# Patient Record
Sex: FEMALE | Race: WHITE | NOT HISPANIC OR LATINO | Employment: FULL TIME | URBAN - METROPOLITAN AREA
[De-identification: names, ages, dates, MRNs, and addresses within clinical notes are randomized per-mention and may not be internally consistent; named-entity substitution may affect disease eponyms.]

---

## 2021-10-12 ENCOUNTER — HOSPITAL ENCOUNTER (EMERGENCY)
Facility: HOSPITAL | Age: 31
Discharge: HOME | End: 2021-10-12
Attending: EMERGENCY MEDICINE | Admitting: EMERGENCY MEDICINE

## 2021-10-12 ENCOUNTER — APPOINTMENT (OUTPATIENT)
Dept: GENERAL RADIOLOGY | Facility: HOSPITAL | Age: 31
End: 2021-10-12

## 2021-10-12 VITALS
HEART RATE: 103 BPM | HEIGHT: 62 IN | RESPIRATION RATE: 16 BRPM | BODY MASS INDEX: 31.24 KG/M2 | WEIGHT: 169.75 LBS | TEMPERATURE: 97.8 F | SYSTOLIC BLOOD PRESSURE: 118 MMHG | DIASTOLIC BLOOD PRESSURE: 83 MMHG | OXYGEN SATURATION: 97 %

## 2021-10-12 DIAGNOSIS — S63.501A RIGHT WRIST SPRAIN, INITIAL ENCOUNTER: Primary | ICD-10-CM

## 2021-10-12 PROCEDURE — 73110 X-RAY EXAM OF WRIST: CPT

## 2021-10-12 PROCEDURE — 99282 EMERGENCY DEPT VISIT SF MDM: CPT

## 2021-10-12 NOTE — ED PROVIDER NOTES
Subjective   31-year-old female presents to the emergency department with complaints of right dorsal wrist swelling and tenderness after smashing it between 2 carts at work last night. Patient noted that swelling continued to increase, tenderness is gotten worse. She denies any significant pain, not requesting anything for pain relief at this time. Pain is nonradiating, 3/10, worse with extension of the wrist or palpation. Neurovascularly intact. Sensation intact. No other injuries to report at this time.      History provided by:  Patient   used: No        Review of Systems   Constitutional: Negative for chills and fever.   HENT: Negative for congestion, ear pain and sore throat.    Eyes: Negative for pain.   Respiratory: Negative for cough, chest tightness and shortness of breath.    Cardiovascular: Negative for chest pain.   Gastrointestinal: Negative for abdominal pain, diarrhea, nausea and vomiting.   Genitourinary: Negative for flank pain and hematuria.   Musculoskeletal: Negative for joint swelling.        Right wrist pain and swelling   Skin: Negative for pallor.   Neurological: Negative for seizures and headaches.   All other systems reviewed and are negative.      History reviewed. No pertinent past medical history.    No Known Allergies    History reviewed. No pertinent surgical history.    History reviewed. No pertinent family history.    Social History     Socioeconomic History   • Marital status:    Tobacco Use   • Smoking status: Never Smoker   Substance and Sexual Activity   • Alcohol use: Never   • Drug use: Never           Objective   Physical Exam  Vitals and nursing note reviewed.   Constitutional:       General: She is not in acute distress.     Appearance: Normal appearance. She is not toxic-appearing.   HENT:      Head: Normocephalic and atraumatic.      Mouth/Throat:      Mouth: Mucous membranes are moist.   Eyes:      Extraocular Movements: Extraocular movements  intact.      Pupils: Pupils are equal, round, and reactive to light.   Cardiovascular:      Rate and Rhythm: Normal rate and regular rhythm.      Pulses: Normal pulses.      Heart sounds: Normal heart sounds.   Pulmonary:      Effort: Pulmonary effort is normal. No respiratory distress.      Breath sounds: Normal breath sounds.   Abdominal:      General: Abdomen is flat.      Palpations: Abdomen is soft.      Tenderness: There is no abdominal tenderness.   Musculoskeletal:         General: Swelling, tenderness and signs of injury present. No deformity. Normal range of motion.        Arms:       Cervical back: Normal range of motion and neck supple.      Right lower leg: No edema.      Left lower leg: No edema.   Skin:     General: Skin is warm and dry.   Neurological:      Mental Status: She is alert and oriented to person, place, and time. Mental status is at baseline.         Procedures           ED Course                                           MDM  Number of Diagnoses or Management Options  Diagnosis management comments: The patient´s symptoms are consistent with right wrist sprain. The patient is now resting comfortably, feels better, is alert, talkative, interactive and in no distress. The examination is unremarkable and benign, with the exception of some mild swelling. The patient is neurologically intact and is ambulatory in the ED. The history, physical exam, and x-ray do not suggest acute fracture at this time or require other process requiring further testing, treatment or consultation in the emergency department. The vital signs have been stable. The patient's condition is stable and appropriate for discharge. The patient will pursue further outpatient evaluation with the primary care physician or other designated for consulting position as indicated in the discharge instructions.       Amount and/or Complexity of Data Reviewed  Tests in the radiology section of CPT®: reviewed and ordered    Risk of  Complications, Morbidity, and/or Mortality  Presenting problems: low  Diagnostic procedures: low  Management options: low    Patient Progress  Patient progress: stable      Final diagnoses:   Right wrist sprain, initial encounter       ED Disposition  ED Disposition     ED Disposition Condition Comment    Discharge Stable           No follow-up provider specified.       Medication List      No changes were made to your prescriptions during this visit.          Lorena Kraus PA-C  10/12/21 1022

## 2022-05-05 ENCOUNTER — LAB (OUTPATIENT)
Dept: LAB | Facility: HOSPITAL | Age: 32
End: 2022-05-05

## 2022-05-05 ENCOUNTER — OFFICE VISIT (OUTPATIENT)
Dept: FAMILY MEDICINE CLINIC | Facility: CLINIC | Age: 32
End: 2022-05-05

## 2022-05-05 ENCOUNTER — HOSPITAL ENCOUNTER (OUTPATIENT)
Dept: GENERAL RADIOLOGY | Facility: HOSPITAL | Age: 32
Discharge: HOME | End: 2022-05-05

## 2022-05-05 VITALS
HEIGHT: 62 IN | WEIGHT: 175 LBS | SYSTOLIC BLOOD PRESSURE: 121 MMHG | OXYGEN SATURATION: 98 % | BODY MASS INDEX: 32.2 KG/M2 | DIASTOLIC BLOOD PRESSURE: 69 MMHG | HEART RATE: 96 BPM

## 2022-05-05 DIAGNOSIS — R20.2 PARESTHESIA OF BOTH HANDS: ICD-10-CM

## 2022-05-05 DIAGNOSIS — Z76.89 ENCOUNTER TO ESTABLISH CARE: Primary | ICD-10-CM

## 2022-05-05 DIAGNOSIS — M79.641 PAIN IN BOTH HANDS: ICD-10-CM

## 2022-05-05 DIAGNOSIS — M79.642 PAIN IN BOTH HANDS: ICD-10-CM

## 2022-05-05 DIAGNOSIS — M25.40 JOINT SWELLING: ICD-10-CM

## 2022-05-05 DIAGNOSIS — J30.9 ALLERGIC RHINITIS, UNSPECIFIED SEASONALITY, UNSPECIFIED TRIGGER: ICD-10-CM

## 2022-05-05 LAB
CHROMATIN AB SERPL-ACNC: <10 IU/ML (ref 0–14)
CRP SERPL-MCNC: <0.3 MG/DL (ref 0–0.5)
URATE SERPL-MCNC: 3.6 MG/DL (ref 2.4–5.7)

## 2022-05-05 PROCEDURE — 36415 COLL VENOUS BLD VENIPUNCTURE: CPT

## 2022-05-05 PROCEDURE — 84550 ASSAY OF BLOOD/URIC ACID: CPT

## 2022-05-05 PROCEDURE — 86063 ANTISTREPTOLYSIN O SCREEN: CPT

## 2022-05-05 PROCEDURE — 86431 RHEUMATOID FACTOR QUANT: CPT

## 2022-05-05 PROCEDURE — 86140 C-REACTIVE PROTEIN: CPT

## 2022-05-05 PROCEDURE — 99204 OFFICE O/P NEW MOD 45 MIN: CPT | Performed by: NURSE PRACTITIONER

## 2022-05-05 PROCEDURE — 86060 ANTISTREPTOLYSIN O TITER: CPT

## 2022-05-05 PROCEDURE — 86225 DNA ANTIBODY NATIVE: CPT

## 2022-05-05 PROCEDURE — 86038 ANTINUCLEAR ANTIBODIES: CPT

## 2022-05-05 PROCEDURE — 73130 X-RAY EXAM OF HAND: CPT

## 2022-05-05 NOTE — PROGRESS NOTES
"Chief Complaint  Establish care, allergies, hand pain.  Subjective          Angie Harp presents to [facility] for   History of Present Illness  Patient presents today to establish Care (Patient would like referral to get back on allergy shots. ) and Hand Pain (Pain in both hands and arms, no known injury. Patient says it has gotten worse overtime. 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  States that sometimes her joints look swollen as well.  Patient previously on allergy shots would like to resume those.    Medical History  Past Medical History:   Diagnosis Date   • Allergic    • Anxiety    • Arthritis    • Depression      Surgical History  Past Surgical History:   Procedure Laterality Date   •  SECTION     • WISDOM TOOTH EXTRACTION       Social History  Social History     Socioeconomic History   • Marital status:    Tobacco Use   • Smoking status: Former Smoker   • Smokeless tobacco: Never Used   Substance and Sexual Activity   • Alcohol use: Never   • Drug use: Never     No current outpatient medications on file.    Review of Systems     Objective     /69   Pulse 96   Ht 157.5 cm (62\")   Wt 79.4 kg (175 lb)   SpO2 98%   BMI 32.01 kg/m²     Body mass index is 32.01 kg/m².    Physical Exam  Vitals reviewed.   Constitutional:       Appearance: Normal appearance. She is well-developed.   HENT:      Head: Normocephalic and atraumatic.      Right Ear: External ear normal.      Left Ear: External ear normal.   Eyes:      Conjunctiva/sclera: Conjunctivae normal.      Pupils: Pupils are equal, round, and reactive to light.   Cardiovascular:      Rate and Rhythm: Normal rate and regular rhythm.      Heart sounds: No murmur heard.    No " friction rub. No gallop.   Pulmonary:      Effort: Pulmonary effort is normal.      Breath sounds: Normal breath sounds. No wheezing or rhonchi.   Musculoskeletal:      Right hand: Tenderness present.      Left hand: Tenderness present.      Comments: Pos phalen sign on right     ttp base of bilat thumbs    Skin:     General: Skin is warm and dry.   Neurological:      Mental Status: She is alert and oriented to person, place, and time.      Cranial Nerves: No cranial nerve deficit.   Psychiatric:         Mood and Affect: Mood and affect normal.         Behavior: Behavior normal.         Thought Content: Thought content normal.         Judgment: Judgment normal.         Result Review :     The following data was reviewed by: RADHIKA Whiting on 05/05/2022:                         Assessment:  Diagnoses and all orders for this visit:    1. Encounter to establish care (Primary)    2. Allergic rhinitis, unspecified seasonality, unspecified trigger  -     Ambulatory Referral to Allergy    3. Pain in both hands  -     EMG & Nerve Conduction Test; Future  -     XR Hand 3+ View Bilateral; Future    4. Joint swelling  -     EMG & Nerve Conduction Test; Future  -     XR Hand 3+ View Bilateral; Future    5. Paresthesia of both hands  -     EMG & Nerve Conduction Test; Future              Follow Up     Return if symptoms worsen or fail to improve.    Patient was given instructions and counseling regarding her condition or for health maintenance advice. Please see specific information pulled into the AVS if appropriate.     RADHIKA Whiting  05/05/2022

## 2022-05-06 LAB
ASO AB SERPL-ACNC: POSITIVE [IU]/ML
DSDNA IGG SERPL IA-ACNC: NEGATIVE [IU]/ML
NUCLEAR IGG SER IA-RTO: NEGATIVE

## 2022-05-07 LAB — ASO AB SERPL-ACNC: 483.8 IU/ML (ref 0–200)

## 2022-05-09 ENCOUNTER — TELEPHONE (OUTPATIENT)
Dept: FAMILY MEDICINE CLINIC | Facility: CLINIC | Age: 32
End: 2022-05-09

## 2022-05-09 NOTE — TELEPHONE ENCOUNTER
All other results negative other than the ASO.  This is a titer that shows probable recent infection with strep.  Has she had strep throat or any other strep infection in the last few months that she is aware of?

## 2022-05-09 NOTE — TELEPHONE ENCOUNTER
I saw patient rheumatoid factor was negative, were you able to review her other results?    Thanks

## 2022-05-09 NOTE — TELEPHONE ENCOUNTER
Caller: Angie Harp    Relationship: Self    Best call back number: [phone]    What is the best time to reach you: ANY     Who are you requesting to speak with CLINICAL     What was the call regarding: PATIENT WOULD LIKE A CALL BACK REGARDING LAB RESULTS     Do you require a callback: YES      Request for :  Cymbalta 60 mg capsule take one capsule daily     Patient now seeing Vanita Duffy. Last prescription issued by [facility] 03/17/17; #90, with 1 refill   Deny request for refill  Denied as a duplicate

## 2022-05-09 NOTE — TELEPHONE ENCOUNTER
Patient has not had infection that she knows of. She said she has a sore throat now but not before then. Patient says she is not on any medication for this.

## 2022-05-10 ENCOUNTER — LAB (OUTPATIENT)
Dept: FAMILY MEDICINE CLINIC | Facility: CLINIC | Age: 32
End: 2022-05-10

## 2022-05-10 DIAGNOSIS — J02.9 SORE THROAT: Primary | ICD-10-CM

## 2022-05-10 LAB
EXPIRATION DATE: NORMAL
INTERNAL CONTROL: NORMAL
Lab: NORMAL
S PYO AG THROAT QL: NEGATIVE

## 2022-05-10 PROCEDURE — 87880 STREP A ASSAY W/OPTIC: CPT | Performed by: NURSE PRACTITIONER

## 2022-05-10 PROCEDURE — 87081 CULTURE SCREEN ONLY: CPT | Performed by: NURSE PRACTITIONER

## 2022-05-10 PROCEDURE — 87147 CULTURE TYPE IMMUNOLOGIC: CPT | Performed by: NURSE PRACTITIONER

## 2022-05-12 ENCOUNTER — TELEPHONE (OUTPATIENT)
Dept: FAMILY MEDICINE CLINIC | Facility: CLINIC | Age: 32
End: 2022-05-12

## 2022-05-13 LAB — BACTERIA SPEC AEROBE CULT: ABNORMAL

## 2022-05-16 DIAGNOSIS — J02.0 STREP THROAT: Primary | ICD-10-CM

## 2022-05-16 RX ORDER — AMOXICILLIN 875 MG/1
875 TABLET, COATED ORAL 2 TIMES DAILY
Qty: 20 TABLET | Refills: 0 | Status: SHIPPED | OUTPATIENT
Start: 2022-05-16 | End: 2022-08-29

## 2022-05-16 NOTE — TELEPHONE ENCOUNTER
----- Message from RADHIKA Rosas sent at 5/16/2022  7:10 AM EDT -----  Positive for strep, please cue up amoxicillin 875 mg 1 tab p.o. twice daily for 10 days #20 with no refills.

## 2022-08-01 ENCOUNTER — PROCEDURE VISIT (OUTPATIENT)
Dept: NEUROLOGY | Facility: CLINIC | Age: 32
End: 2022-08-01

## 2022-08-01 VITALS
BODY MASS INDEX: 32.2 KG/M2 | HEART RATE: 90 BPM | DIASTOLIC BLOOD PRESSURE: 78 MMHG | HEIGHT: 62 IN | SYSTOLIC BLOOD PRESSURE: 109 MMHG | WEIGHT: 175 LBS

## 2022-08-01 DIAGNOSIS — R20.2 PARESTHESIA OF BOTH HANDS: ICD-10-CM

## 2022-08-01 DIAGNOSIS — G56.03 BILATERAL CARPAL TUNNEL SYNDROME: Primary | ICD-10-CM

## 2022-08-01 DIAGNOSIS — M25.40 JOINT SWELLING: ICD-10-CM

## 2022-08-01 DIAGNOSIS — M79.641 PAIN IN BOTH HANDS: ICD-10-CM

## 2022-08-01 DIAGNOSIS — M79.642 PAIN IN BOTH HANDS: ICD-10-CM

## 2022-08-01 PROCEDURE — 99202 OFFICE O/P NEW SF 15 MIN: CPT | Performed by: PSYCHIATRY & NEUROLOGY

## 2022-08-01 PROCEDURE — 95910 NRV CNDJ TEST 7-8 STUDIES: CPT | Performed by: PSYCHIATRY & NEUROLOGY

## 2022-08-01 NOTE — PROGRESS NOTES
"Chief Complaint  Numbness (BUE ), Pain (BUE ), and Extremity Weakness (BUE )    Subjective          Angie Harp is a 32 y.o. female who presents to [facility]  History of Present Illness  32-year-old woman evaluated for bilateral hand numbness and tingling and pain.  She states that this been going on for the last 3 to 4 years but has been worse in the last year.  She states that it happens several times a day when she is working.  It depends what she is doing.  In the morning it could get numb and tingly and painful goes up her arm and it can last for an hour.  States that she is losing .  She drops things.  Objective   Vital Signs:   /78   Pulse 90   Ht 157.5 cm (62\")   Wt 79.4 kg (175 lb)   BMI 32.01 kg/m²     Physical Exam   There is 4/5 strength bilateral abductor pollicis brevis muscles.  There is no weakness other muscles tested including the flexor muscles of the fingers.  Phalen sign is positive almost immediately.  Pressure the wrist produces pain in her fingers.  There is pain in her thumb joints to palpation.        Assessment and Plan  Diagnoses and all orders for this visit:    1. Bilateral carpal tunnel syndrome (Primary)  Assessment & Plan:  Nerve conduction study is abnormal and shows electrophysiologic evidence for severe bilateral carpal tunnel syndrome.  I will refer her to orthopedic surgery.  I discussed with her that she can use a specific form of wrist splint to [email or web address].    Orders:  -     Ambulatory Referral to Orthopedic Surgery    2. Pain in both hands  -     EMG & Nerve Conduction Test    3. Joint swelling  -     EMG & Nerve Conduction Test    4. Paresthesia of both hands  -     EMG & Nerve Conduction Test       Nerve Conduction Study:  6 nerves     EMG:  Not done    Total time spent with the patient and coordinating patient care was 15 minutes.    Follow Up  No follow-ups on file.  Patient was given instructions and " counseling regarding her condition or for health maintenance advice. Please see specific information pulled into the AVS if appropriate.

## 2022-08-01 NOTE — ASSESSMENT & PLAN NOTE
Nerve conduction study is abnormal and shows electrophysiologic evidence for severe bilateral carpal tunnel syndrome.  I will refer her to orthopedic surgery.  I discussed with her that she can use a specific form of wrist splint to [email or web address].

## 2022-08-12 ENCOUNTER — OFFICE VISIT (OUTPATIENT)
Dept: ORTHOPEDIC SURGERY | Facility: CLINIC | Age: 32
End: 2022-08-12

## 2022-08-12 ENCOUNTER — TELEPHONE (OUTPATIENT)
Dept: ORTHOPEDIC SURGERY | Facility: CLINIC | Age: 32
End: 2022-08-12

## 2022-08-12 VITALS — BODY MASS INDEX: 32.2 KG/M2 | WEIGHT: 175 LBS | HEIGHT: 62 IN

## 2022-08-12 DIAGNOSIS — G56.03 BILATERAL CARPAL TUNNEL SYNDROME: Primary | ICD-10-CM

## 2022-08-12 PROCEDURE — 99203 OFFICE O/P NEW LOW 30 MIN: CPT | Performed by: ORTHOPAEDIC SURGERY

## 2022-08-12 NOTE — PROGRESS NOTES
"Chief Complaint  Initial Evaluation of the Right Hand and Initial Evaluation of the Left Hand     Subjective      Angie Harp presents to [facility] for an evaluation of bilateral hands. Patient states she went from day shift to night shift, she noticed numbness and tingling in bilateral hands. She states numbness and tingling for about 3 years, the last 2-3 months the symptoms significantly worsened. She has tried bracing, this has become less effective over time. She noticed a weakened gripped and difficulty with opening things.     No Known Allergies     Social History     Socioeconomic History   • Marital status:    Tobacco Use   • Smoking status: Former Smoker   • Smokeless tobacco: Never Used   Vaping Use   • Vaping Use: Never used   Substance and Sexual Activity   • Alcohol use: Never   • Drug use: Never        Review of Systems     Objective   Vital Signs:   Ht 157.5 cm (62\")   Wt 79.4 kg (175 lb)   BMI 32.01 kg/m²       Physical Exam  Constitutional:       Appearance: Normal appearance. Patient is well-developed and normal weight.   HENT:      Head: Normocephalic.      Right Ear: Hearing and external ear normal.      Left Ear: Hearing and external ear normal.      Nose: Nose normal.   Eyes:      Conjunctiva/sclera: Conjunctivae normal.   Cardiovascular:      Rate and Rhythm: Normal rate.   Pulmonary:      Effort: Pulmonary effort is normal.      Breath sounds: No wheezing or rales.   Abdominal:      Palpations: Abdomen is soft.      Tenderness: There is no abdominal tenderness.   Musculoskeletal:      Cervical back: Normal range of motion.   Skin:     Findings: No rash.   Neurological:      Mental Status: Patient  is alert and oriented to person, place, and time.   Psychiatric:         Mood and Affect: Mood and affect normal.         Judgment: Judgment normal.       Ortho Exam      BILATERAL HANDS: Mild thenar atrophy. Decreased sensation. Positive tinel's. " Positive median nerve compression test. Radial pulse 2+, ulnar pulse 2+. No swelling. Intact finger and wrist range of motion.       Procedures        Imaging Results (Most Recent)     None           Result Review :     [facility]      8/1/22     EMG     IMPRESSION: Severe bilateral carpal tunnel syndrome       Assessment and Plan     Diagnoses and all orders for this visit:    1. Bilateral carpal tunnel syndrome (Primary)        Discussed carpal tunnel release vs injection. She will consider her surgical options. May call back to schedule if and when she is ready. She will considre injections.     Discussed surgery. and Call or return if worsening symptoms.    Follow Up     PRN.       Patient was given instructions and counseling regarding her condition or for health maintenance advice. Please see specific information pulled into the AVS if appropriate.     Scribed for Kemar Perez MD by Antonietta Prieto.  08/12/22   14:45 EDT    I have personally performed the services described in this document as scribed by the above individual and it is both accurate and complete. Kemar Perez MD 08/12/22
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
## 2022-09-08 ENCOUNTER — TELEPHONE (OUTPATIENT)
Dept: ORTHOPEDIC SURGERY | Facility: CLINIC | Age: 32
End: 2022-09-08

## 2022-09-19 ENCOUNTER — OFFICE VISIT (OUTPATIENT)
Dept: ORTHOPEDIC SURGERY | Facility: CLINIC | Age: 32
End: 2022-09-19

## 2022-09-19 VITALS — HEIGHT: 62 IN | BODY MASS INDEX: 31.28 KG/M2 | WEIGHT: 170 LBS

## 2022-09-19 DIAGNOSIS — G56.03 BILATERAL CARPAL TUNNEL SYNDROME: Primary | ICD-10-CM

## 2022-09-19 PROCEDURE — 20526 THER INJECTION CARP TUNNEL: CPT | Performed by: ORTHOPAEDIC SURGERY

## 2022-09-19 RX ORDER — LIDOCAINE HYDROCHLORIDE 10 MG/ML
1 INJECTION, SOLUTION INFILTRATION; PERINEURAL
Status: COMPLETED | OUTPATIENT
Start: 2022-09-19 | End: 2022-09-19

## 2022-09-19 RX ORDER — TRIAMCINOLONE ACETONIDE 40 MG/ML
40 INJECTION, SUSPENSION INTRA-ARTICULAR; INTRAMUSCULAR
Status: COMPLETED | OUTPATIENT
Start: 2022-09-19 | End: 2022-09-19

## 2022-09-19 RX ADMIN — LIDOCAINE HYDROCHLORIDE 1 ML: 10 INJECTION, SOLUTION INFILTRATION; PERINEURAL at 09:43

## 2022-09-19 RX ADMIN — TRIAMCINOLONE ACETONIDE 40 MG: 40 INJECTION, SUSPENSION INTRA-ARTICULAR; INTRAMUSCULAR at 09:43

## 2022-09-19 NOTE — PROGRESS NOTES
"Chief Complaint  Follow-up of the Right Wrist and Follow-up of the Left Wrist     Subjective      Angie Harp presents to [facility] for a follow-up of bilateral wrists. Patient transitioned from night shift to day shift when she started noticing numbness and tingling in her hands. Symptoms have been ongoing for 3 years but the last few months it has worsened and become more persistent. She has  braced the hands but this method has become less effective with time. She has developed some weakness with her  strength. We had discussed carpal tunnel release and injections last visit. She is interested in trying injections today.      No Known Allergies     Social History     Socioeconomic History   • Marital status:    Tobacco Use   • Smoking status: Former Smoker   • Smokeless tobacco: Never Used   Vaping Use   • Vaping Use: Never used   Substance and Sexual Activity   • Alcohol use: Never   • Drug use: Never        Review of Systems     Objective   Vital Signs:   Ht 157.5 cm (62\")   Wt 77.1 kg (170 lb)   BMI 31.09 kg/m²       Physical Exam  Constitutional:       Appearance: Normal appearance. Patient is well-developed and normal weight.   HENT:      Head: Normocephalic.      Right Ear: Hearing and external ear normal.      Left Ear: Hearing and external ear normal.      Nose: Nose normal.   Eyes:      Conjunctiva/sclera: Conjunctivae normal.   Cardiovascular:      Rate and Rhythm: Normal rate.   Pulmonary:      Effort: Pulmonary effort is normal.      Breath sounds: No wheezing or rales.   Abdominal:      Palpations: Abdomen is soft.      Tenderness: There is no abdominal tenderness.   Musculoskeletal:      Cervical back: Normal range of motion.   Skin:     Findings: No rash.   Neurological:      Mental Status: Patient  is alert and oriented to person, place, and time.   Psychiatric:         Mood and Affect: Mood and affect normal.         Judgment: Judgment normal. "       Ortho Exam      BILATERAL WRISTS: Positive median nerve compression test. Full wrist extension, full wrist flexion, full  with some weakness, full thumb opposition. Full PIP flexors, full DIP flexors, full PIP extensors. Sensation grossly intact. Neurovascular intact.  Full abduction and adduction of the fingers. Skin intact. No swelling, skin discoloration or atrophy.       Medium Joint Arthrocentesis  Date/Time: 9/19/2022 9:43 AM  Consent given by: patient  Site marked: site marked  Timeout: Immediately prior to procedure a time out was called to verify the correct patient, procedure, equipment, support staff and site/side marked as required   Procedure Details  Location: wrist -   Preparation: Patient was prepped and draped in the usual sterile fashion  Needle size: 23 G  Medications administered: 1 mL lidocaine 1 %; 40 mg triamcinolone acetonide 40 MG/ML  Patient tolerance: patient tolerated the procedure well with no immediate complications    Medium Joint Arthrocentesis  Date/Time: 9/19/2022 9:43 AM  Consent given by: patient  Site marked: site marked  Timeout: Immediately prior to procedure a time out was called to verify the correct patient, procedure, equipment, support staff and site/side marked as required   Procedure Details  Location: wrist -   Preparation: Patient was prepped and draped in the usual sterile fashion  Needle size: 23 G  Medications administered: 1 mL lidocaine 1 %; 40 mg triamcinolone acetonide 40 MG/ML  Patient tolerance: patient tolerated the procedure well with no immediate complications              Imaging Results (Most Recent)     None           Result Review :         No results found.           Assessment and Plan     Diagnoses and all orders for this visit:    1. Bilateral carpal tunnel syndrome (Primary)        Plan for bilateral carpal tunnel injections.   Risks and benefits of carpal tunnel injections discussed, she understands and wishes to proceed. Patient  tolerated this well.     She is a candidate for a carpal tunnel release. If injections fail, she will consider proceeding with this.     Call or return if worsening symptoms.    Follow Up     Prn.       Patient was given instructions and counseling regarding her condition or for health maintenance advice. Please see specific information pulled into the AVS if appropriate.     Scribed for Kemar Perez MD by Antonietta Prieto.  09/19/22   09:38 EDT      I have personally performed the services described in this document as scribed by the above individual and it is both accurate and complete. eKmar Perez MD 09/19/22

## 2022-12-15 ENCOUNTER — HOSPITAL ENCOUNTER (EMERGENCY)
Facility: HOSPITAL | Age: 32
Discharge: LEFT BEFORE DISCHARGE | End: 2022-12-15
Admitting: EMERGENCY MEDICINE

## 2022-12-15 VITALS
RESPIRATION RATE: 18 BRPM | SYSTOLIC BLOOD PRESSURE: 125 MMHG | HEART RATE: 88 BPM | WEIGHT: 179.45 LBS | HEIGHT: 62 IN | DIASTOLIC BLOOD PRESSURE: 81 MMHG | OXYGEN SATURATION: 100 % | BODY MASS INDEX: 33.02 KG/M2 | TEMPERATURE: 98 F

## 2022-12-15 DIAGNOSIS — R10.84 GENERALIZED ABDOMINAL PAIN: Primary | ICD-10-CM

## 2022-12-15 LAB
ALBUMIN SERPL-MCNC: 4.3 G/DL (ref 3.5–5.2)
ALBUMIN/GLOB SERPL: 1.5 G/DL
ALP SERPL-CCNC: 48 U/L (ref 39–117)
ALT SERPL W P-5'-P-CCNC: 11 U/L (ref 1–33)
ANION GAP SERPL CALCULATED.3IONS-SCNC: 9.2 MMOL/L (ref 5–15)
AST SERPL-CCNC: 16 U/L (ref 1–32)
BACTERIA UR QL AUTO: ABNORMAL /HPF
BASOPHILS # BLD AUTO: 0.04 10*3/MM3 (ref 0–0.2)
BASOPHILS NFR BLD AUTO: 0.4 % (ref 0–1.5)
BILIRUB SERPL-MCNC: 0.4 MG/DL (ref 0–1.2)
BILIRUB UR QL STRIP: NEGATIVE
BUN SERPL-MCNC: 16 MG/DL (ref 6–20)
BUN/CREAT SERPL: 22.9 (ref 7–25)
CALCIUM SPEC-SCNC: 9.2 MG/DL (ref 8.6–10.5)
CHLORIDE SERPL-SCNC: 103 MMOL/L (ref 98–107)
CLARITY UR: CLEAR
CO2 SERPL-SCNC: 25.8 MMOL/L (ref 22–29)
COLOR UR: YELLOW
CREAT SERPL-MCNC: 0.7 MG/DL (ref 0.57–1)
DEPRECATED RDW RBC AUTO: 40.3 FL (ref 37–54)
EGFRCR SERPLBLD CKD-EPI 2021: 116.6 ML/MIN/1.73
EOSINOPHIL # BLD AUTO: 0.06 10*3/MM3 (ref 0–0.4)
EOSINOPHIL NFR BLD AUTO: 0.6 % (ref 0.3–6.2)
ERYTHROCYTE [DISTWIDTH] IN BLOOD BY AUTOMATED COUNT: 12.1 % (ref 12.3–15.4)
GLOBULIN UR ELPH-MCNC: 2.9 GM/DL
GLUCOSE SERPL-MCNC: 103 MG/DL (ref 65–99)
GLUCOSE UR STRIP-MCNC: NEGATIVE MG/DL
HCG INTACT+B SERPL-ACNC: <0.5 MIU/ML
HCT VFR BLD AUTO: 40.4 % (ref 34–46.6)
HGB BLD-MCNC: 13.9 G/DL (ref 12–15.9)
HGB UR QL STRIP.AUTO: NEGATIVE
HOLD SPECIMEN: NORMAL
HOLD SPECIMEN: NORMAL
HYALINE CASTS UR QL AUTO: ABNORMAL /LPF
IMM GRANULOCYTES # BLD AUTO: 0.03 10*3/MM3 (ref 0–0.05)
IMM GRANULOCYTES NFR BLD AUTO: 0.3 % (ref 0–0.5)
KETONES UR QL STRIP: NEGATIVE
LEUKOCYTE ESTERASE UR QL STRIP.AUTO: ABNORMAL
LIPASE SERPL-CCNC: 22 U/L (ref 13–60)
LYMPHOCYTES # BLD AUTO: 1.98 10*3/MM3 (ref 0.7–3.1)
LYMPHOCYTES NFR BLD AUTO: 20.9 % (ref 19.6–45.3)
MCH RBC QN AUTO: 31.2 PG (ref 26.6–33)
MCHC RBC AUTO-ENTMCNC: 34.4 G/DL (ref 31.5–35.7)
MCV RBC AUTO: 90.6 FL (ref 79–97)
MONOCYTES # BLD AUTO: 0.51 10*3/MM3 (ref 0.1–0.9)
MONOCYTES NFR BLD AUTO: 5.4 % (ref 5–12)
NEUTROPHILS NFR BLD AUTO: 6.84 10*3/MM3 (ref 1.7–7)
NEUTROPHILS NFR BLD AUTO: 72.4 % (ref 42.7–76)
NITRITE UR QL STRIP: NEGATIVE
NRBC BLD AUTO-RTO: 0 /100 WBC (ref 0–0.2)
PH UR STRIP.AUTO: 6.5 [PH] (ref 5–8)
PLATELET # BLD AUTO: 231 10*3/MM3 (ref 140–450)
PMV BLD AUTO: 10 FL (ref 6–12)
POTASSIUM SERPL-SCNC: 4.1 MMOL/L (ref 3.5–5.2)
PROT SERPL-MCNC: 7.2 G/DL (ref 6–8.5)
PROT UR QL STRIP: NEGATIVE
RBC # BLD AUTO: 4.46 10*6/MM3 (ref 3.77–5.28)
RBC # UR STRIP: ABNORMAL /HPF
REF LAB TEST METHOD: ABNORMAL
SODIUM SERPL-SCNC: 138 MMOL/L (ref 136–145)
SP GR UR STRIP: 1.01 (ref 1–1.03)
SQUAMOUS #/AREA URNS HPF: ABNORMAL /HPF
UROBILINOGEN UR QL STRIP: ABNORMAL
WBC # UR STRIP: ABNORMAL /HPF
WBC NRBC COR # BLD: 9.46 10*3/MM3 (ref 3.4–10.8)
WHOLE BLOOD HOLD COAG: NORMAL
WHOLE BLOOD HOLD SPECIMEN: NORMAL

## 2022-12-15 PROCEDURE — 83690 ASSAY OF LIPASE: CPT

## 2022-12-15 PROCEDURE — 81001 URINALYSIS AUTO W/SCOPE: CPT

## 2022-12-15 PROCEDURE — 85025 COMPLETE CBC W/AUTO DIFF WBC: CPT

## 2022-12-15 PROCEDURE — 84702 CHORIONIC GONADOTROPIN TEST: CPT

## 2022-12-15 PROCEDURE — 80053 COMPREHEN METABOLIC PANEL: CPT

## 2022-12-15 PROCEDURE — 36415 COLL VENOUS BLD VENIPUNCTURE: CPT

## 2022-12-15 PROCEDURE — 99283 EMERGENCY DEPT VISIT LOW MDM: CPT

## 2022-12-15 RX ORDER — SODIUM CHLORIDE 0.9 % (FLUSH) 0.9 %
10 SYRINGE (ML) INJECTION AS NEEDED
Status: DISCONTINUED | OUTPATIENT
Start: 2022-12-15 | End: 2022-12-15 | Stop reason: HOSPADM

## 2022-12-15 NOTE — ED PROVIDER NOTES
"Time: 2:42 PM EST  Chief Complaint:   Chief Complaint   Patient presents with   • Abdominal Pain           History of Present Illness:  Patient is a 32 y.o. year old female who presents to the emergency department with abdominal pain and nausea as well as being late on her menstrual period.  Patient reports that her menstrual cycles are very irregular.  She denies fever/chills, constipation or diarrhea.          HPI        Patient Care Team  Primary Care Provider: Angelica Lo APRN    Past Medical History:     No Known Allergies  Past Medical History:   Diagnosis Date   • Allergic    • Anxiety    • Arthritis    • Depression      Past Surgical History:   Procedure Laterality Date   •  SECTION     • WISDOM TOOTH EXTRACTION       Family History   Problem Relation Age of Onset   • Stroke Mother    • Heart disease Mother        Home Medications:  Prior to Admission medications    Medication Sig Start Date End Date Taking? Authorizing Provider   albuterol sulfate  (90 Base) MCG/ACT inhaler Inhale 2 puffs Every 4 (Four) Hours As Needed for Wheezing or Shortness of Air. 22   Katherin Lakhani APRN   methylPREDNISolone (MEDROL) 4 MG dose pack Take as directed on package instructions. 22   Katherin Lakhani APRN        Social History:   Social History     Tobacco Use   • Smoking status: Former   • Smokeless tobacco: Never   Vaping Use   • Vaping Use: Never used   Substance Use Topics   • Alcohol use: Never   • Drug use: Never         Review of Systems:  Review of Systems   Gastrointestinal: Positive for abdominal pain and nausea.   All other systems reviewed and are negative.       Physical Exam:  /81   Pulse 88   Temp 98 °F (36.7 °C)   Resp 18   Ht 157.5 cm (62\")   Wt 81.4 kg (179 lb 7.3 oz)   LMP 2022 (Approximate)   SpO2 100%   BMI 32.82 kg/m²     Physical Exam  HENT:      Head: Normocephalic.      Mouth/Throat:      Mouth: Mucous membranes are moist.   Eyes: "      Pupils: Pupils are equal, round, and reactive to light.   Pulmonary:      Effort: Pulmonary effort is normal.   Abdominal:      General: There is no distension.   Musculoskeletal:      Cervical back: Neck supple.   Skin:     General: Skin is warm and dry.   Neurological:      General: No focal deficit present.      Mental Status: She is alert and oriented to person, place, and time.   Psychiatric:         Mood and Affect: Mood normal.         Behavior: Behavior normal.                Medications in the Emergency Department:  Medications - No data to display     Labs  Lab Results (last 24 hours)     Procedure Component Value Units Date/Time    CBC & Differential [[id number]]  (Abnormal) Collected: 12/15/22 1305    Specimen: Blood Updated: 12/15/22 1320    Narrative:      The following orders were created for panel order CBC & Differential.  Procedure                               Abnormality         Status                     ---------                               -----------         ------                     CBC Auto Differential[[id number]]        Abnormal            Final result                 Please view results for these tests on the individual orders.    Comprehensive Metabolic Panel [[id number]]  (Abnormal) Collected: 12/15/22 1305    Specimen: Blood Updated: 12/15/22 1343     Glucose 103 mg/dL      BUN 16 mg/dL      Creatinine 0.70 mg/dL      Sodium 138 mmol/L      Potassium 4.1 mmol/L      Chloride 103 mmol/L      CO2 25.8 mmol/L      Calcium 9.2 mg/dL      Total Protein 7.2 g/dL      Albumin 4.30 g/dL      ALT (SGPT) 11 U/L      AST (SGOT) 16 U/L      Alkaline Phosphatase 48 U/L      Total Bilirubin 0.4 mg/dL      Globulin 2.9 gm/dL      A/G Ratio 1.5 g/dL      BUN/Creatinine Ratio 22.9     Anion Gap 9.2 mmol/L      eGFR 116.6 mL/min/1.73      Comment: National Kidney Foundation and American Society of Nephrology (ASN) Task Force recommended calculation based on the Chronic Kidney Disease Epidemiology  Collaboration (CKD-EPI) equation refit without adjustment for race.       Narrative:      GFR Normal >60  Chronic Kidney Disease <60  Kidney Failure <15      Lipase [[id number]]  (Normal) Collected: 12/15/22 1305    Specimen: Blood Updated: 12/15/22 1343     Lipase 22 U/L     hCG, Quantitative, Pregnancy [[id number]] Collected: 12/15/22 1305    Specimen: Blood Updated: 12/15/22 1343     HCG Quantitative <0.50 mIU/mL     Narrative:      HCG Ranges by Gestational Age    Females - non-pregnant premenopausal   </= 1mIU/mL HCG  Females - postmenopausal               </= 7mIU/mL HCG    3 Weeks       5.4   -      72 mIU/mL  4 Weeks      10.2   -     708 mIU/mL  5 Weeks       217   -   8,245 mIU/mL  6 Weeks       152   -  32,177 mIU/mL  7 Weeks     4,059   - 153,767 mIU/mL  8 Weeks    31,366   - 149,094 mIU/mL  9 Weeks    59,109   - 135,901 mIU/mL  10 Weeks   44,186   - 170,409 mIU/mL  12 Weeks   27,107   - 201,615 mIU/mL  14 Weeks   24,302   -  93,646 mIU/mL  15 Weeks   12,540   -  69,747 mIU/mL  16 Weeks    8,904   -  55,332 mIU/mL  17 Weeks    8,240   -  51,793 mIU/mL  18 Weeks    9,649   -  55,271 mIU/mL    Results may be falsely decreased if patient taking Biotin.      CBC Auto Differential [[id number]]  (Abnormal) Collected: 12/15/22 1305    Specimen: Blood Updated: 12/15/22 1320     WBC 9.46 10*3/mm3      RBC 4.46 10*6/mm3      Hemoglobin 13.9 g/dL      Hematocrit 40.4 %      MCV 90.6 fL      MCH 31.2 pg      MCHC 34.4 g/dL      RDW 12.1 %      RDW-SD 40.3 fl      MPV 10.0 fL      Platelets 231 10*3/mm3      Neutrophil % 72.4 %      Lymphocyte % 20.9 %      Monocyte % 5.4 %      Eosinophil % 0.6 %      Basophil % 0.4 %      Immature Grans % 0.3 %      Neutrophils, Absolute 6.84 10*3/mm3      Lymphocytes, Absolute 1.98 10*3/mm3      Monocytes, Absolute 0.51 10*3/mm3      Eosinophils, Absolute 0.06 10*3/mm3      Basophils, Absolute 0.04 10*3/mm3      Immature Grans, Absolute 0.03 10*3/mm3      nRBC 0.0 /100 WBC      Urinalysis With Microscopic If Indicated (No Culture) - Urine, Clean Catch [[id number]]  (Abnormal) Collected: 12/15/22 1313    Specimen: Urine, Clean Catch Updated: 12/15/22 1328     Color, UA Yellow     Appearance, UA Clear     pH, UA 6.5     Specific Gravity, UA 1.013     Glucose, UA Negative     Ketones, UA Negative     Bilirubin, UA Negative     Blood, UA Negative     Protein, UA Negative     Leuk Esterase, UA Trace     Nitrite, UA Negative     Urobilinogen, UA 0.2 E.U./dL    Urinalysis, Microscopic Only - Urine, Clean Catch [[id number]]  (Abnormal) Collected: 12/15/22 1313    Specimen: Urine, Clean Catch Updated: 12/15/22 1328     RBC, UA 0-2 /HPF      WBC, UA 0-2 /HPF      Bacteria, UA None Seen /HPF      Squamous Epithelial Cells, UA 0-2 /HPF      Hyaline Casts, UA 0-2 /LPF      Methodology Automated Microscopy           Imaging:  No Radiology Exams Resulted Within Past 24 Hours    Procedures:  Procedures    Progress                            The patient was initially evaluated in the triage area where orders were placed. The patient was later dispositioned by RADHIKA Kerr.      The patient was advised to stay for completion of workup which includes but is not limited to communication of labs and radiological results, reassessment and plan. The patient was advised that leaving prior to disposition by a provider could result in critical findings that are not communicated to the patient.     Medical Decision Making:  MDM  Number of Diagnoses or Management Options  Generalized abdominal pain  Diagnosis management comments: During Pit evaluation I discussed the patient's lab results thus far.  Patient wants to be discharged at this time. I advised patient that I would discharge her AGAINST MEDICAL ADVICE.    The patient has decision-making capacity. The patient understands the medical recommendations for further testing and evaluation.    The risks of leaving including death, permanent neurological  disability, cardiovascular collapse, shock, or worsening of their condition. The patient understands these risks and was able to verbalize them back to me.    Although I disagree with the patient's decision to leave, I do not feel that it is appropriate to hold the patient against their will.    The patient is still electing to leave.     The patient was advised and encouraged to return at any time to complete evaluation.  The patient will be discharged per patient request.  The patient is to return immediately for worsening of their condition or other concerns.  The patient was given discharge instructions.  Expeditious follow up was strongly encouraged.       Amount and/or Complexity of Data Reviewed  Clinical lab tests: reviewed and ordered    Risk of Complications, Morbidity, and/or Mortality  Presenting problems: low    Patient Progress  Patient progress: stable           The following orders were placed after triage and evaluation:  Orders Placed This Encounter   Procedures   • [place] Draw   • Comprehensive Metabolic Panel   • Lipase   • Urinalysis With Microscopic If Indicated (No Culture) - Urine, Clean Catch   • hCG, Quantitative, Pregnancy   • CBC Auto Differential   • Urinalysis, Microscopic Only - Urine, Clean Catch   • CBC & Differential   • Green Top (Gel)   • Lavender Top   • Gold Top - SST   • Light Blue Top       Final diagnoses:   Generalized abdominal pain          Disposition:  ED Disposition     ED Disposition   AMA    Condition   --    Comment   --             This medical record created using voice recognition software.           Freya Huynh, APRN  12/15/22 6302

## 2023-04-03 PROCEDURE — U0004 COV-19 TEST NON-CDC HGH THRU: HCPCS | Performed by: FAMILY MEDICINE

## 2023-04-04 ENCOUNTER — TELEPHONE (OUTPATIENT)
Dept: URGENT CARE | Facility: CLINIC | Age: 33
End: 2023-04-04
Payer: COMMERCIAL

## 2023-04-04 NOTE — TELEPHONE ENCOUNTER
----- Message from RADHIKA Lowery sent at 4/3/2023  8:25 PM EDT -----  Please notify patient of negative COVID-19 test result.

## 2023-07-17 PROBLEM — J45.909 ASTHMA: Status: ACTIVE | Noted: 2023-07-17

## 2023-07-17 PROBLEM — J30.9 ALLERGIC RHINITIS: Status: ACTIVE | Noted: 2023-07-17

## 2023-08-17 ENCOUNTER — OFFICE VISIT (OUTPATIENT)
Dept: FAMILY MEDICINE CLINIC | Facility: CLINIC | Age: 33
End: 2023-08-17
Payer: COMMERCIAL

## 2023-08-17 VITALS
WEIGHT: 178 LBS | HEIGHT: 62 IN | HEART RATE: 74 BPM | OXYGEN SATURATION: 97 % | BODY MASS INDEX: 32.76 KG/M2 | SYSTOLIC BLOOD PRESSURE: 112 MMHG | DIASTOLIC BLOOD PRESSURE: 59 MMHG

## 2023-08-17 DIAGNOSIS — H60.501 ACUTE OTITIS EXTERNA OF RIGHT EAR, UNSPECIFIED TYPE: Primary | ICD-10-CM

## 2023-08-17 PROCEDURE — 99213 OFFICE O/P EST LOW 20 MIN: CPT

## 2023-08-17 PROCEDURE — 1160F RVW MEDS BY RX/DR IN RCRD: CPT

## 2023-08-17 PROCEDURE — 1159F MED LIST DOCD IN RCRD: CPT

## 2023-08-17 RX ORDER — AMOXICILLIN AND CLAVULANATE POTASSIUM 875; 125 MG/1; MG/1
1 TABLET, FILM COATED ORAL 2 TIMES DAILY
Qty: 14 TABLET | Refills: 0 | Status: SHIPPED | OUTPATIENT
Start: 2023-08-17

## 2023-08-17 NOTE — PROGRESS NOTES
"Chief Complaint  Earache (/)    SUBJECTIVE  Angie Harp presents to [facility]    History of Present Illness  Patient is a 33-year-old female presents today for acute visit.  She is a patient of RADHIKA Portillo.  Patient reports complaints of right ear itching aching sore to the touch.  Patient reports that symptoms started last night.  Patient reports when she woke up this morning it was very sore.  Patient reports history of chronic ear infections.  Patient reports she has a hole in that tympanic membrane.  Patient does not want to use any type of eardrops.  No other concerns or questions today.        Past Medical History:   Diagnosis Date    Allergic     Anxiety     Arthritis     Depression       Family History   Problem Relation Age of Onset    Stroke Mother     Heart disease Mother       Past Surgical History:   Procedure Laterality Date     SECTION      WISDOM TOOTH EXTRACTION          Current Outpatient Medications:     albuterol sulfate  (90 Base) MCG/ACT inhaler, Inhale 2 puffs Every 4 (Four) Hours As Needed for Wheezing or Shortness of Air., Disp: 6.7 g, Rfl: 0    amoxicillin-clavulanate (AUGMENTIN) 875-125 MG per tablet, Take 1 tablet by mouth 2 (Two) Times a Day., Disp: 14 tablet, Rfl: 0    OBJECTIVE  Vital Signs:   /59   Pulse 74   Ht 157.5 cm (62\")   Wt 80.7 kg (178 lb)   LMP 2023   SpO2 97%   BMI 32.56 kg/mý    Estimated body mass index is 32.56 kg/mý as calculated from the following:    Height as of this encounter: 157.5 cm (62\").    Weight as of this encounter: 80.7 kg (178 lb).     Wt Readings from Last 3 Encounters:   23 80.7 kg (178 lb)   23 81.6 kg (180 lb)   23 80.3 kg (177 lb)     BP Readings from Last 3 Encounters:   23 112/59   23 110/71   23 108/65       Physical Exam  Vitals reviewed.   Constitutional:       Appearance: Normal appearance.   HENT:      Head: Normocephalic " and atraumatic.      Right Ear: Tympanic membrane normal. Swelling and tenderness present.      Ears:      Comments: Right ear canal erythematous and mild swelling, no drainage noted.     Nose: Nose normal.   Eyes:      Conjunctiva/sclera: Conjunctivae normal.   Cardiovascular:      Rate and Rhythm: Normal rate and regular rhythm.      Heart sounds: Normal heart sounds.   Pulmonary:      Effort: Pulmonary effort is normal.      Breath sounds: Normal breath sounds.   Skin:     General: Skin is warm and dry.   Neurological:      General: No focal deficit present.      Mental Status: She is alert and oriented to person, place, and time.   Psychiatric:         Mood and Affect: Mood normal.         Behavior: Behavior normal.         Thought Content: Thought content normal.         Judgment: Judgment normal.        Result Review    Common labs          12/15/2022    13:05 7/19/2023    12:25   Common Labs   Glucose 103  80    BUN 16  16    Creatinine 0.70  0.79    Sodium 138  138    Potassium 4.1  4.1    Chloride 103  101    Calcium 9.2  9.8    Albumin 4.30  4.4    Total Bilirubin 0.4  0.7    Alkaline Phosphatase 48  43    AST (SGOT) 16  22    ALT (SGPT) 11  17    WBC 9.46  5.78    Hemoglobin 13.9  13.8    Hematocrit 40.4  41.0    Platelets 231  212    Total Cholesterol  161    Triglycerides  51    HDL Cholesterol  60    LDL Cholesterol   90        No Images in the past 120 days found..      The above data has been reviewed by RADHIKA Benton 08/17/2023 11:12 EDT.          Patient Care Team:  Angelica Lo APRN as PCP - General (Nurse Practitioner)            ASSESSMENT & PLAN    Diagnoses and all orders for this visit:    1. Acute otitis externa of right ear, unspecified type (Primary)  Comments:  refusing ear drops, start augmentin  Orders:  -     amoxicillin-clavulanate (AUGMENTIN) 875-125 MG per tablet; Take 1 tablet by mouth 2 (Two) Times a Day.  Dispense: 14 tablet; Refill: 0         Tobacco Use: Medium  Risk    Smoking Tobacco Use: Former    Smokeless Tobacco Use: Never    Passive Exposure: Not on file       Follow Up     Return if symptoms worsen or fail to improve.      Patient was given instructions and counseling regarding her condition or for health maintenance advice. Please see specific information pulled into the AVS if appropriate.   I have reviewed information obtained and documented by others and I have confirmed the accuracy of this documented note.    RADHIKA Benton

## 2023-10-31 ENCOUNTER — HOSPITAL ENCOUNTER (EMERGENCY)
Facility: HOSPITAL | Age: 33
Discharge: HOME | End: 2023-10-31
Attending: EMERGENCY MEDICINE | Admitting: EMERGENCY MEDICINE
Payer: COMMERCIAL

## 2023-10-31 VITALS
RESPIRATION RATE: 18 BRPM | SYSTOLIC BLOOD PRESSURE: 104 MMHG | HEIGHT: 62 IN | BODY MASS INDEX: 32.33 KG/M2 | HEART RATE: 81 BPM | TEMPERATURE: 99.4 F | DIASTOLIC BLOOD PRESSURE: 69 MMHG | WEIGHT: 175.71 LBS | OXYGEN SATURATION: 95 %

## 2023-10-31 DIAGNOSIS — E86.0 DEHYDRATION: ICD-10-CM

## 2023-10-31 DIAGNOSIS — B34.9 VIRAL ILLNESS: Primary | ICD-10-CM

## 2023-10-31 LAB
ALBUMIN SERPL-MCNC: 4.5 G/DL (ref 3.5–5.2)
ALBUMIN/GLOB SERPL: 1.5 G/DL
ALP SERPL-CCNC: 55 U/L (ref 39–117)
ALT SERPL W P-5'-P-CCNC: 10 U/L (ref 1–33)
ANION GAP SERPL CALCULATED.3IONS-SCNC: 9.7 MMOL/L (ref 5–15)
AST SERPL-CCNC: 21 U/L (ref 1–32)
BASOPHILS # BLD AUTO: 0.04 10*3/MM3 (ref 0–0.2)
BASOPHILS NFR BLD AUTO: 0.3 % (ref 0–1.5)
BILIRUB SERPL-MCNC: 0.9 MG/DL (ref 0–1.2)
BUN SERPL-MCNC: 16 MG/DL (ref 6–20)
BUN/CREAT SERPL: 18.8 (ref 7–25)
CALCIUM SPEC-SCNC: 9.8 MG/DL (ref 8.6–10.5)
CHLORIDE SERPL-SCNC: 100 MMOL/L (ref 98–107)
CO2 SERPL-SCNC: 27.3 MMOL/L (ref 22–29)
CREAT SERPL-MCNC: 0.85 MG/DL (ref 0.57–1)
DEPRECATED RDW RBC AUTO: 40 FL (ref 37–54)
EGFRCR SERPLBLD CKD-EPI 2021: 91.8 ML/MIN/1.73
EOSINOPHIL # BLD AUTO: 0.02 10*3/MM3 (ref 0–0.4)
EOSINOPHIL NFR BLD AUTO: 0.1 % (ref 0.3–6.2)
ERYTHROCYTE [DISTWIDTH] IN BLOOD BY AUTOMATED COUNT: 12.2 % (ref 12.3–15.4)
FLUAV SUBTYP SPEC NAA+PROBE: NOT DETECTED
FLUBV RNA ISLT QL NAA+PROBE: NOT DETECTED
GLOBULIN UR ELPH-MCNC: 3.1 GM/DL
GLUCOSE SERPL-MCNC: 103 MG/DL (ref 65–99)
HCT VFR BLD AUTO: 40.3 % (ref 34–46.6)
HGB BLD-MCNC: 13.7 G/DL (ref 12–15.9)
HOLD SPECIMEN: NORMAL
HOLD SPECIMEN: NORMAL
IMM GRANULOCYTES # BLD AUTO: 0.04 10*3/MM3 (ref 0–0.05)
IMM GRANULOCYTES NFR BLD AUTO: 0.3 % (ref 0–0.5)
LYMPHOCYTES # BLD AUTO: 1.43 10*3/MM3 (ref 0.7–3.1)
LYMPHOCYTES NFR BLD AUTO: 10.5 % (ref 19.6–45.3)
MAGNESIUM SERPL-MCNC: 1.7 MG/DL (ref 1.6–2.6)
MCH RBC QN AUTO: 30.5 PG (ref 26.6–33)
MCHC RBC AUTO-ENTMCNC: 34 G/DL (ref 31.5–35.7)
MCV RBC AUTO: 89.8 FL (ref 79–97)
MONOCYTES # BLD AUTO: 0.7 10*3/MM3 (ref 0.1–0.9)
MONOCYTES NFR BLD AUTO: 5.1 % (ref 5–12)
NEUTROPHILS NFR BLD AUTO: 11.42 10*3/MM3 (ref 1.7–7)
NEUTROPHILS NFR BLD AUTO: 83.7 % (ref 42.7–76)
NRBC BLD AUTO-RTO: 0 /100 WBC (ref 0–0.2)
PLATELET # BLD AUTO: 258 10*3/MM3 (ref 140–450)
PMV BLD AUTO: 10.1 FL (ref 6–12)
POTASSIUM SERPL-SCNC: 3.8 MMOL/L (ref 3.5–5.2)
PROT SERPL-MCNC: 7.6 G/DL (ref 6–8.5)
QT INTERVAL: 389 MS
QTC INTERVAL: 466 MS
RBC # BLD AUTO: 4.49 10*6/MM3 (ref 3.77–5.28)
RSV RNA NPH QL NAA+NON-PROBE: NOT DETECTED
S PYO AG THROAT QL: NEGATIVE
SARS-COV-2 RNA RESP QL NAA+PROBE: NOT DETECTED
SODIUM SERPL-SCNC: 137 MMOL/L (ref 136–145)
TROPONIN T SERPL HS-MCNC: <6 NG/L
WBC NRBC COR # BLD: 13.65 10*3/MM3 (ref 3.4–10.8)
WHOLE BLOOD HOLD COAG: NORMAL
WHOLE BLOOD HOLD SPECIMEN: NORMAL

## 2023-10-31 PROCEDURE — 87637 SARSCOV2&INF A&B&RSV AMP PRB: CPT | Performed by: REGISTERED NURSE

## 2023-10-31 PROCEDURE — 99283 EMERGENCY DEPT VISIT LOW MDM: CPT

## 2023-10-31 PROCEDURE — 87880 STREP A ASSAY W/OPTIC: CPT | Performed by: REGISTERED NURSE

## 2023-10-31 PROCEDURE — 93005 ELECTROCARDIOGRAM TRACING: CPT | Performed by: EMERGENCY MEDICINE

## 2023-10-31 PROCEDURE — 25810000003 SODIUM CHLORIDE 0.9 % SOLUTION: Performed by: REGISTERED NURSE

## 2023-10-31 PROCEDURE — 84484 ASSAY OF TROPONIN QUANT: CPT | Performed by: EMERGENCY MEDICINE

## 2023-10-31 PROCEDURE — 87081 CULTURE SCREEN ONLY: CPT | Performed by: REGISTERED NURSE

## 2023-10-31 PROCEDURE — 83735 ASSAY OF MAGNESIUM: CPT | Performed by: EMERGENCY MEDICINE

## 2023-10-31 PROCEDURE — 85025 COMPLETE CBC W/AUTO DIFF WBC: CPT | Performed by: EMERGENCY MEDICINE

## 2023-10-31 PROCEDURE — 80053 COMPREHEN METABOLIC PANEL: CPT | Performed by: EMERGENCY MEDICINE

## 2023-10-31 PROCEDURE — 93005 ELECTROCARDIOGRAM TRACING: CPT

## 2023-10-31 RX ORDER — SODIUM CHLORIDE 0.9 % (FLUSH) 0.9 %
10 SYRINGE (ML) INJECTION AS NEEDED
Status: DISCONTINUED | OUTPATIENT
Start: 2023-10-31 | End: 2023-10-31 | Stop reason: HOSPADM

## 2023-10-31 RX ORDER — ONDANSETRON 4 MG/1
4 TABLET, ORALLY DISINTEGRATING ORAL EVERY 8 HOURS PRN
Qty: 30 TABLET | Refills: 0 | Status: SHIPPED | OUTPATIENT
Start: 2023-10-31 | End: 2023-11-10

## 2023-10-31 RX ADMIN — SODIUM CHLORIDE 1000 ML: 9 INJECTION, SOLUTION INTRAVENOUS at 08:22

## 2023-10-31 NOTE — ED PROVIDER NOTES
"Time: 7:50 AM EDT  Date of encounter:  10/31/2023  Independent Historian/Clinical History and Information was obtained by:   Patient    History is limited by: N/A    Chief Complaint: Syncope      History of Present Illness:  Patient is a 33 y.o. year old female who presents to the emergency department for evaluation after syncopal episode.  Patient states she has felt \"under the weather\" all night while at work and left work at [employer] early this morning around 4 AM.  Patient reports that she has had sore throat, body aches, headache since yesterday.  States she was standing at her kitchen sink when she was starting to feel nauseated and lightheaded and then woke up on the floor.  Denies fever/chills, abdominal pain, diarrhea or constipation.  Denies cough, chest pain, shortness of breath.    HPI    Patient Care Team  Primary Care Provider: Angelica Lo APRN    Past Medical History:     No Known Allergies  Past Medical History:   Diagnosis Date    Allergic     Anxiety     Arthritis     Depression      Past Surgical History:   Procedure Laterality Date     SECTION      WISDOM TOOTH EXTRACTION       Family History   Problem Relation Age of Onset    Stroke Mother     Heart disease Mother        Home Medications:  Prior to Admission medications    Medication Sig Start Date End Date Taking? Authorizing Provider   albuterol sulfate  (90 Base) MCG/ACT inhaler Inhale 2 puffs Every 4 (Four) Hours As Needed for Wheezing or Shortness of Air. 23   Angelica Lo APRN   amoxicillin-clavulanate (AUGMENTIN) 875-125 MG per tablet Take 1 tablet by mouth 2 (Two) Times a Day. 8/17/23 10/31/23  Dulce Quiroz APRN        Social History:   Social History     Tobacco Use    Smoking status: Former    Smokeless tobacco: Never   Vaping Use    Vaping Use: Some days    Substances: Nicotine    Devices: Disposable   Substance Use Topics    Alcohol use: Never    Drug use: Never         Review of " "Systems:  Review of Systems   I performed a 10 point review of systems which was all negative, except for the positives found in the HPI above.  Physical Exam:  /69   Pulse 81   Temp 99.4 °F (37.4 °C) (Oral)   Resp 18   Ht 157.5 cm (62\")   Wt 79.7 kg (175 lb 11.3 oz)   LMP 10/24/2023   SpO2 95%   BMI 32.14 kg/m²     Physical Exam     General: Awake alert and in no acute distress     HEENT: Head normocephalic atraumatic, eyes PERRLA EOMI, nose normal, pharyngeal erythema, +2 tonsils bilaterally.     Neck: Supple full range of motion, no meningismus, no lymphadenopathy     Heart: Regular rate and rhythm, no murmurs or rubs, 2+ radial pulses bilaterally     Lungs: Clear to auscultation bilaterally without wheezes or crackles, no respiratory distress     Abdomen: Soft, nontender, nondistended, no rebound or guarding     Back exam:  No L-spine tenderness.  No rash.     Skin: Warm, dry, no rash     Musculoskeletal: Normal range of motion, no lower extremity edema     Neurologic: Oriented x3, no motor deficits no sensory deficits     Psychiatric: Mood appears stable, no psychosis          Procedures:  Procedures      Medical Decision Making:      Comorbidities that affect care:    None    External Notes reviewed:          The following orders were placed and all results were independently analyzed by me:  Orders Placed This Encounter   Procedures    COVID-19, FLU A/B, RSV PCR - Swab, Nasopharynx    Rapid Strep A Screen - Swab, Throat    Beta Strep Culture, Throat - Swab, Throat    [place] Draw    Comprehensive Metabolic Panel    Magnesium    Single High Sensitivity Troponin T    CBC Auto Differential    Undress & Gown    Continuous Pulse Oximetry    Vital Signs    Orthostatic Blood Pressure    Orthostatic Vitals    ECG 12 Lead ED Triage Standing Order; Syncope    CBC & Differential    Green Top (Gel)    Lavender Top    Gold Top - SST    Light Blue Top       Medications Given in the Emergency " Department:  Medications   sodium chloride 0.9 % bolus 1,000 mL (0 mL Intravenous Stopped 10/31/23 1031)        ED Course:         Labs:    Lab Results (last 24 hours)       ** No results found for the last 24 hours. **             Imaging:    No Radiology Exams Resulted Within Past 24 Hours      Differential Diagnosis and Discussion:    Sore Throat: Differential diagnosis includes but is not limited to bacterial infection, viral infection, inhaled irritants, sinus drainage, thyroiditis, epiglottitis, and retropharyngeal abscess.  Syncope: Differential diagnosis includes but is not limited to TIA, hyperventilation, aortic stenosis, pulmonary emboli, myocardial disease, bradycardia arrhythmia, heart block, tachyarrhythmia, vasovagal, orthostatic hypotension, ruptured AAA, aortic dissection, subarachnoid hemorrhage, seizure, hypoglycemia.    All labs were reviewed and interpreted by me.  EKG was interpreted by me.    MDM  Number of Diagnoses or Management Options  Dehydration  Viral illness  Diagnosis management comments: The patient is resting comfortably and feels better after IV fluids, is alert, talkative and in no distress. The repeat examination is unremarkable and benign. The patient is neurologically intact, has a normal mental status and this ambulatory in the ED. The history, exam, diagnostic testing in the patient's current condition do not suggest meningitis, stroke, sepsis, subarachnoid hemorrhage, intracranial bleeding, encephalitis or other significant pathology that would warrant further testing, continued ED treatment, admission, neurological consultation, or other specialist evaluation at this point. The vital signs have been stable. The patient's condition is stable and appropriate for discharge. The patient will pursue further outpatient evaluation with the primary care physician or other designated or consulting position as indicated in the discharge instructions. Discussed return precautions,  discharge instructions and answered all their questions.        Amount and/or Complexity of Data Reviewed  Clinical lab tests: reviewed and ordered  Tests in the medicine section of CPT®: reviewed and ordered    Risk of Complications, Morbidity, and/or Mortality  Presenting problems: moderate  Diagnostic procedures: low  Management options: minimal    Patient Progress  Patient progress: improved             Patient Care Considerations:          Consultants/Shared Management Plan:        Social Determinants of Health:    Patient is independent, reliable, and has access to care.       Disposition and Care Coordination:            Final diagnoses:   Viral illness   Dehydration        ED Disposition       ED Disposition   Discharge    Condition   Stable    Comment   --               This medical record created using voice recognition software.             Freya Huynh, APRN  11/03/23 0148

## 2023-10-31 NOTE — DISCHARGE INSTRUCTIONS
Your flu, strep and COVID swab were negative.  You likely have some other viral illness.  Rest.  Drink plenty fluids.  Eat a healthful diet.    Take Zofran as needed for nausea.  Take Tylenol Motrin as needed for fever or body aches.    Follow-up with your PCP in 3 to 4 days if not better.    Return for any new concerns.

## 2023-11-02 LAB — BACTERIA SPEC AEROBE CULT: NORMAL

## 2023-11-14 ENCOUNTER — OFFICE VISIT (OUTPATIENT)
Dept: FAMILY MEDICINE CLINIC | Facility: CLINIC | Age: 33
End: 2023-11-14
Payer: COMMERCIAL

## 2023-11-14 VITALS
WEIGHT: 174.4 LBS | HEIGHT: 62 IN | HEART RATE: 85 BPM | OXYGEN SATURATION: 99 % | SYSTOLIC BLOOD PRESSURE: 137 MMHG | DIASTOLIC BLOOD PRESSURE: 75 MMHG | BODY MASS INDEX: 32.09 KG/M2

## 2023-11-14 DIAGNOSIS — R11.0 NAUSEA: ICD-10-CM

## 2023-11-14 DIAGNOSIS — R42 DIZZINESS: Primary | ICD-10-CM

## 2023-11-14 PROCEDURE — 99213 OFFICE O/P EST LOW 20 MIN: CPT

## 2023-11-14 PROCEDURE — 1160F RVW MEDS BY RX/DR IN RCRD: CPT

## 2023-11-14 PROCEDURE — 1159F MED LIST DOCD IN RCRD: CPT

## 2023-11-14 RX ORDER — MECLIZINE HYDROCHLORIDE 25 MG/1
25 TABLET ORAL 3 TIMES DAILY PRN
Qty: 30 TABLET | Refills: 0 | Status: SHIPPED | OUTPATIENT
Start: 2023-11-14

## 2023-11-14 NOTE — PROGRESS NOTES
"Chief Complaint  Dizziness, nausea    SUBJECTIVE  Angieparth Harp presents to [facility]    History of Present Illness  Patient is a 33-year-old female who presents today with complaints of dizziness and lightheadedness.  Patient was seen in the ER on  for fainting.  Patient has not fainted since then.  Patient reports when she moves positions or turns her head too fast she has dizziness.  Patient reports she also has accompanying nausea.  Patient reports she has Zofran at home from the ER.  Patient reports she has an appointment with ENT in 3 days.  Patient has a history of perforated tympanic membrane.    Past Medical History:   Diagnosis Date    Allergic     Anxiety     Arthritis     Depression       Family History   Problem Relation Age of Onset    Stroke Mother     Heart disease Mother       Past Surgical History:   Procedure Laterality Date     SECTION      WISDOM TOOTH EXTRACTION          Current Outpatient Medications:     albuterol sulfate  (90 Base) MCG/ACT inhaler, Inhale 2 puffs Every 4 (Four) Hours As Needed for Wheezing or Shortness of Air., Disp: 6.7 g, Rfl: 0    meclizine (ANTIVERT) 25 MG tablet, Take 1 tablet by mouth 3 (Three) Times a Day As Needed for Dizziness., Disp: 30 tablet, Rfl: 0    OBJECTIVE  Vital Signs:   /75 (BP Location: Left arm, Patient Position: Sitting, Cuff Size: Adult)   Pulse 85   Ht 157.5 cm (62\")   Wt 79.1 kg (174 lb 6.4 oz)   LMP 10/24/2023   SpO2 99%   BMI 31.90 kg/m²    Estimated body mass index is 31.9 kg/m² as calculated from the following:    Height as of this encounter: 157.5 cm (62\").    Weight as of this encounter: 79.1 kg (174 lb 6.4 oz).     Wt Readings from Last 3 Encounters:   23 79.1 kg (174 lb 6.4 oz)   23 79.4 kg (175 lb)   10/31/23 79.7 kg (175 lb 11.3 oz)     BP Readings from Last 3 Encounters:   23 137/75   23 112/70   10/31/23 104/69       Physical Exam  Vitals " reviewed.   Constitutional:       General: She is not in acute distress.     Appearance: She is not ill-appearing.   HENT:      Head: Normocephalic and atraumatic.      Right Ear: Ear canal and external ear normal.      Left Ear: Tympanic membrane, ear canal and external ear normal.      Ears:      Comments: Small chunk of cerumen interferes with full visualization of right TM, 50% visualized normal     Nose: Nose normal.   Eyes:      Conjunctiva/sclera: Conjunctivae normal.   Cardiovascular:      Rate and Rhythm: Regular rhythm.      Heart sounds: Normal heart sounds.   Pulmonary:      Effort: Pulmonary effort is normal.   Musculoskeletal:      Cervical back: Normal range of motion.   Skin:     General: Skin is warm and dry.   Neurological:      General: No focal deficit present.      Mental Status: She is alert and oriented to person, place, and time.   Psychiatric:         Mood and Affect: Mood normal.         Behavior: Behavior normal.         Thought Content: Thought content normal.         Judgment: Judgment normal.          Result Review    Common labs          12/15/2022    13:05 7/19/2023    12:25 10/31/2023    07:54   Common Labs   Glucose 103  80  103    BUN 16  16  16    Creatinine 0.70  0.79  0.85    Sodium 138  138  137    Potassium 4.1  4.1  3.8    Chloride 103  101  100    Calcium 9.2  9.8  9.8    Albumin 4.30  4.4  4.5    Total Bilirubin 0.4  0.7  0.9    Alkaline Phosphatase 48  43  55    AST (SGOT) 16  22  21    ALT (SGPT) 11  17  10    WBC 9.46  5.78  13.65    Hemoglobin 13.9  13.8  13.7    Hematocrit 40.4  41.0  40.3    Platelets 231  212  258    Total Cholesterol  161     Triglycerides  51     HDL Cholesterol  60     LDL Cholesterol   90         No Images in the past 120 days found..      The above data has been reviewed by RADHIKA Benton 11/14/2023 07:49 EST.          Patient Care Team:  Angelica Lo APRN as PCP - General (Nurse Practitioner)            ASSESSMENT &  PLAN    Diagnoses and all orders for this visit:    1. Dizziness (Primary)  Comments:  start meclizine, keep appt with ENT on 11/17  Orders:  -     meclizine (ANTIVERT) 25 MG tablet; Take 1 tablet by mouth 3 (Three) Times a Day As Needed for Dizziness.  Dispense: 30 tablet; Refill: 0    2. Nausea  Comments:  keep taking zofran at home as given by ER as needed         Tobacco Use: Medium Risk (11/14/2023)    Patient History     Smoking Tobacco Use: Former     Smokeless Tobacco Use: Never     Passive Exposure: Not on file       Follow Up     Return for ENT in 3 days.      Patient was given instructions and counseling regarding her condition or for health maintenance advice. Please see specific information pulled into the AVS if appropriate.   I have reviewed information obtained and documented by others and I have confirmed the accuracy of this documented note.    RADHIKA Benton

## 2023-11-14 NOTE — PROGRESS NOTES
"Chief Complaint  Dizziness, nausea    SUBJECTIVE  Angieparth Harp presents to [facility]    History of Present Illness  Patient is a 33-year-old female who presents today with complaints of dizziness and lightheadedness.  Patient was seen in the ER on  for fainting.  Patient has not fainted since then.  Patient reports when she moves positions or turns her head too fast she has dizziness.  Patient reports she also has accompanying nausea.  Patient reports she has Zofran at home from the ER.  Patient reports she has an appointment with ENT in 3 days.  Patient has a history of perforated tympanic membrane.    Past Medical History:   Diagnosis Date    Allergic     Anxiety     Arthritis     Depression       Family History   Problem Relation Age of Onset    Stroke Mother     Heart disease Mother       Past Surgical History:   Procedure Laterality Date     SECTION      WISDOM TOOTH EXTRACTION          Current Outpatient Medications:     albuterol sulfate  (90 Base) MCG/ACT inhaler, Inhale 2 puffs Every 4 (Four) Hours As Needed for Wheezing or Shortness of Air., Disp: 6.7 g, Rfl: 0    meclizine (ANTIVERT) 25 MG tablet, Take 1 tablet by mouth 3 (Three) Times a Day As Needed for Dizziness., Disp: 30 tablet, Rfl: 0    OBJECTIVE  Vital Signs:   /75 (BP Location: Left arm, Patient Position: Sitting, Cuff Size: Adult)   Pulse 85   Ht 157.5 cm (62\")   Wt 79.1 kg (174 lb 6.4 oz)   LMP 10/24/2023   SpO2 99%   BMI 31.90 kg/m²    Estimated body mass index is 31.9 kg/m² as calculated from the following:    Height as of this encounter: 157.5 cm (62\").    Weight as of this encounter: 79.1 kg (174 lb 6.4 oz).     Wt Readings from Last 3 Encounters:   23 79.1 kg (174 lb 6.4 oz)   23 79.4 kg (175 lb)   10/31/23 79.7 kg (175 lb 11.3 oz)     BP Readings from Last 3 Encounters:   23 137/75   23 112/70   10/31/23 104/69       Physical Exam  Vitals " reviewed.   Constitutional:       General: She is not in acute distress.     Appearance: She is not ill-appearing.   HENT:      Head: Normocephalic and atraumatic.      Right Ear: Ear canal and external ear normal.      Left Ear: Tympanic membrane, ear canal and external ear normal.      Ears:      Comments: Small chunk of cerumen interferes with full visualization of right TM, 50% visualized normal     Nose: Nose normal.   Eyes:      Conjunctiva/sclera: Conjunctivae normal.   Cardiovascular:      Rate and Rhythm: Regular rhythm.      Heart sounds: Normal heart sounds.   Pulmonary:      Effort: Pulmonary effort is normal.   Musculoskeletal:      Cervical back: Normal range of motion.   Skin:     General: Skin is warm and dry.   Neurological:      General: No focal deficit present.      Mental Status: She is alert and oriented to person, place, and time.   Psychiatric:         Mood and Affect: Mood normal.         Behavior: Behavior normal.         Thought Content: Thought content normal.         Judgment: Judgment normal.          Result Review    Common labs          12/15/2022    13:05 7/19/2023    12:25 10/31/2023    07:54   Common Labs   Glucose 103  80  103    BUN 16  16  16    Creatinine 0.70  0.79  0.85    Sodium 138  138  137    Potassium 4.1  4.1  3.8    Chloride 103  101  100    Calcium 9.2  9.8  9.8    Albumin 4.30  4.4  4.5    Total Bilirubin 0.4  0.7  0.9    Alkaline Phosphatase 48  43  55    AST (SGOT) 16  22  21    ALT (SGPT) 11  17  10    WBC 9.46  5.78  13.65    Hemoglobin 13.9  13.8  13.7    Hematocrit 40.4  41.0  40.3    Platelets 231  212  258    Total Cholesterol  161     Triglycerides  51     HDL Cholesterol  60     LDL Cholesterol   90         No Images in the past 120 days found..      The above data has been reviewed by RADHIKA Benton 11/14/2023 07:49 EST.          Patient Care Team:  Angelica Lo APRN as PCP - General (Nurse Practitioner)            ASSESSMENT &  PLAN    Diagnoses and all orders for this visit:    1. Dizziness (Primary)  Comments:  start meclizine, keep appt with ENT on 11/17  Orders:  -     meclizine (ANTIVERT) 25 MG tablet; Take 1 tablet by mouth 3 (Three) Times a Day As Needed for Dizziness.  Dispense: 30 tablet; Refill: 0    2. Nausea  Comments:  keep taking zofran at home as given by ER as needed         Tobacco Use: Medium Risk (11/14/2023)    Patient History     Smoking Tobacco Use: Former     Smokeless Tobacco Use: Never     Passive Exposure: Not on file       Follow Up     Return for ENT in 3 days.      Patient was given instructions and counseling regarding her condition or for health maintenance advice. Please see specific information pulled into the AVS if appropriate.   I have reviewed information obtained and documented by others and I have confirmed the accuracy of this documented note.    RADHIKA Benton

## 2024-01-03 ENCOUNTER — OFFICE VISIT (OUTPATIENT)
Dept: OBSTETRICS AND GYNECOLOGY | Facility: CLINIC | Age: 34
End: 2024-01-03
Payer: COMMERCIAL

## 2024-01-03 VITALS
WEIGHT: 172 LBS | DIASTOLIC BLOOD PRESSURE: 72 MMHG | SYSTOLIC BLOOD PRESSURE: 128 MMHG | HEART RATE: 83 BPM | BODY MASS INDEX: 31.65 KG/M2 | HEIGHT: 62 IN

## 2024-01-03 DIAGNOSIS — N64.4 BREAST PAIN, RIGHT: ICD-10-CM

## 2024-01-03 DIAGNOSIS — N92.0 MENORRHAGIA WITH REGULAR CYCLE: ICD-10-CM

## 2024-01-03 DIAGNOSIS — N93.9 ABNORMAL UTERINE BLEEDING: Primary | ICD-10-CM

## 2024-01-03 NOTE — PROGRESS NOTES
"GYN Problem/Follow Up Visit    Chief Complaint   Patient presents with    Menorrhagia     Right breast tenderness             HPI  Angie Harp is a 33 y.o. female, , who presents for last menses was abnormal. Typically q mon menses but her last menses was shorter and she passed blood clots. Menses usually last 7 days and they are very heavy, changing products hourly at times. Also has pain all during her menses. She states this last one was really different. She does report a lot of stress. Also c/o right breast pain off and on for about a year. Denies masses or nipple d/c. States it seems to be worse around her menses.        Additional OB/GYN History   Patient's last menstrual period was 2023 (approximate).  Current contraception: contraceptive methods: Tubal ligation  Desires to: continue contraception  Allergies : Patient has no known allergies.     The additional following portions of the patient's history were reviewed and updated as appropriate: allergies, current medications, past family history, past medical history, past social history, past surgical history, and problem list.    Review of Systems    I have reviewed and agree with the HPI, ROS, and historical information as entered above. Katherin Cunningham, APRN    Objective   /72   Pulse 83   Ht 157.5 cm (62\")   Wt 78 kg (172 lb)   LMP 2023 (Approximate)   BMI 31.46 kg/m²     Physical Exam  Vitals reviewed.   Chest:   Breasts:     Breasts are symmetrical.      Right: Tenderness (outer breast/axilla) present. No swelling, bleeding, inverted nipple, mass, nipple discharge or skin change.      Left: Normal. No swelling, bleeding, inverted nipple, mass, nipple discharge, skin change or tenderness.   Lymphadenopathy:      Upper Body:      Right upper body: No supraclavicular, axillary or pectoral adenopathy.      Left upper body: No supraclavicular, axillary or pectoral adenopathy.   Skin:     General: Skin is warm and dry. "   Neurological:      Mental Status: She is alert and oriented to person, place, and time.            Assessment and Plan    Diagnoses and all orders for this visit:    1. Abnormal uterine bleeding (Primary)  -     US Non-ob Transvaginal; Future    2. Breast pain, right    3. Menorrhagia with regular cycle    Will check pelvic u/s. Also discussed long hx of heavy menses but pt unsure if she wants to do anything about that. She was mainly concerned about the change in her last menses. We discussed hormonal and surgical options if she desires tx. Discussed breast pain which could be hormonal. She also drinks caffeine and we discussed cutting back on that. Recommend a comfortable supportive well fitted bra. F/u after u/s.     Counseling:  She understands the importance of having the above orders performed in a timely fashion.  She is encouraged to review her results online and/or contact or office if she has questions.     Follow Up:  Return for u/s f/u.      RADHIKA Beckett  01/03/2024

## 2024-01-17 ENCOUNTER — OFFICE VISIT (OUTPATIENT)
Dept: OTOLARYNGOLOGY | Facility: CLINIC | Age: 34
End: 2024-01-17
Payer: COMMERCIAL

## 2024-01-17 ENCOUNTER — PROCEDURE VISIT (OUTPATIENT)
Dept: OTOLARYNGOLOGY | Facility: CLINIC | Age: 34
End: 2024-01-17
Payer: COMMERCIAL

## 2024-01-17 VITALS
TEMPERATURE: 97.2 F | HEART RATE: 71 BPM | DIASTOLIC BLOOD PRESSURE: 77 MMHG | BODY MASS INDEX: 31.46 KG/M2 | SYSTOLIC BLOOD PRESSURE: 123 MMHG | HEIGHT: 62 IN

## 2024-01-17 DIAGNOSIS — H69.91 CHRONIC EUSTACHIAN TUBE DYSFUNCTION, RIGHT: Primary | ICD-10-CM

## 2024-01-17 DIAGNOSIS — H90.A31 MIXED CONDUCTIVE AND SENSORINEURAL HEARING LOSS OF RIGHT EAR WITH RESTRICTED HEARING OF LEFT EAR: Primary | ICD-10-CM

## 2024-01-17 DIAGNOSIS — R42 DIZZINESS: ICD-10-CM

## 2024-01-17 DIAGNOSIS — H69.91 CHRONIC EUSTACHIAN TUBE DYSFUNCTION, RIGHT: ICD-10-CM

## 2024-01-17 DIAGNOSIS — J30.9 ALLERGIC RHINITIS, UNSPECIFIED SEASONALITY, UNSPECIFIED TRIGGER: ICD-10-CM

## 2024-01-17 DIAGNOSIS — Z87.891 HISTORY OF TOBACCO ABUSE: ICD-10-CM

## 2024-01-17 DIAGNOSIS — H72.01 CENTRAL PERFORATION OF TYMPANIC MEMBRANE OF RIGHT EAR: ICD-10-CM

## 2024-01-17 DIAGNOSIS — R42 VERTIGO: ICD-10-CM

## 2024-01-17 DIAGNOSIS — H90.11 CONDUCTIVE HEARING LOSS OF RIGHT EAR WITH UNRESTRICTED HEARING OF LEFT EAR: ICD-10-CM

## 2024-01-17 DIAGNOSIS — H61.21 HEARING LOSS OF RIGHT EAR DUE TO CERUMEN IMPACTION: ICD-10-CM

## 2024-01-17 PROCEDURE — 69210 REMOVE IMPACTED EAR WAX UNI: CPT | Performed by: OTOLARYNGOLOGY

## 2024-01-17 PROCEDURE — 92557 COMPREHENSIVE HEARING TEST: CPT | Performed by: AUDIOLOGIST

## 2024-01-17 PROCEDURE — 31575 DIAGNOSTIC LARYNGOSCOPY: CPT | Performed by: OTOLARYNGOLOGY

## 2024-01-17 PROCEDURE — 92567 TYMPANOMETRY: CPT | Performed by: AUDIOLOGIST

## 2024-01-17 PROCEDURE — 99204 OFFICE O/P NEW MOD 45 MIN: CPT | Performed by: OTOLARYNGOLOGY

## 2024-01-17 NOTE — PATIENT INSTRUCTIONS
1.  Patient has long history of eustachian tube dysfunction on the right ear.  She never followed up after recommendation of PE tube and flexible nasolaryngoscopy 9 years ago.  2.  Despite removal of cerumen impaction, patient continued to have hearing loss on the right ear.  Therefore we will obtain an audiogram today to evaluate the degree of conductive hearing loss and also looking at the tympanic membrane perforation as well as retraction and vertigo.    3.  Audiogram today shows type C tympanogram on the left and type B on the right.  SRT is 50 on the right and 15 on the left.  Discrimination scores are 100% otherwise.  There is a moderate mixed hearing loss on the right ear while hearing is normal on the left ear.  With this large air-bone gap, I am going to proceed with CT of IACs..  4.  We will proceed with flexible nasolaryngoscopy today also and shows that she still have some residual adenoid tissue present.  Eustachian tube area shows some edema and not clearly defined.  Otherwise nasopharynx is unremarkable  5.  Follow-up after CT scan

## 2024-01-17 NOTE — PROGRESS NOTES
Patient Name: Angie Harp   Visit Date: 01/17/2024   Patient ID: [id number]  Provider: Ranjit Stokes MD    Sex: female  Location: [facility]   YOB: 1990  Location Address: [address] [place] [address], [address] [place] [address],?[place]?[id number]    Primary Care Provider Angelica Lo APRN  Location Phone: [phone]    Referring Provider: RADHIKA Rosas        Chief Complaint  whole in ear drum    History of Present Illness  Angie Harp is a 34 y.o. female who presents to [facility] for whole in ear drum.  Patient has a long history of ear problems where she was seen at [facility] in 10/17/2014 with complaints of right-sided hearing loss and fullness.  She was noted to have right tympanic membrane significantly retracted and audiogram obtained at that time showed that she has a large conductive gap of about 40 dB with type a tympanogram and small volume.  Her SRT was 45 on the right and 10 on the left while discrimination scores were 100%.  Left hearing was within normal limits but right moderate conductive loss was noted.  She was recommended myringotomy with tube and flexible nasolaryngoscopy.  However patient was lost to follow-up.  On 11/14/2023 patient was seen at the office by Ms. Quiroz who noted that patient complained of dizziness and lightheadedness.  Patient was seen in the emergency room on 10/31/2023 for fainting.  Patient also had nausea associated with that and she was treated with Zofran at home from the ER.  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  It appears that patient most likely had serous chronic effusion that had tympanic membrane rupture resulting in a chronic perforation most likely dating about 9 ears.  Patient complaining of hearing loss in the right ear.    Past Medical History:   Diagnosis Date    Allergic     Anxiety     Arthritis     Asthma     Depression   "      Past Surgical History:   Procedure Laterality Date     SECTION      TUBAL ABDOMINAL LIGATION      WISDOM TOOTH EXTRACTION           Current Outpatient Medications:     albuterol sulfate  (90 Base) MCG/ACT inhaler, Inhale 2 puffs Every 4 (Four) Hours As Needed for Wheezing or Shortness of Air., Disp: 6.7 g, Rfl: 0    meclizine (ANTIVERT) 25 MG tablet, Take 1 tablet by mouth 3 (Three) Times a Day As Needed for Dizziness. (Patient not taking: Reported on 2024), Disp: 30 tablet, Rfl: 0     No Known Allergies    Social History     Tobacco Use    Smoking status: Former     Types: Cigarettes     Quit date:      Years since quittin.0    Smokeless tobacco: Never   Vaping Use    Vaping Use: Every day    Substances: Nicotine    Devices: Disposable   Substance Use Topics    Alcohol use: Never    Drug use: Never        Objective     Vital Signs:   Vitals:    24 0917   BP: 123/77   BP Location: Right arm   Patient Position: Sitting   Cuff Size: Adult   Pulse: 71   Temp: 97.2 °F (36.2 °C)   TempSrc: Temporal   Height: 157.5 cm (62\")       Tobacco Use: Medium Risk (2024)    Patient History     Smoking Tobacco Use: Former     Smokeless Tobacco Use: Never     Passive Exposure: Not on file         Physical Exam    Constitutional   Appearance  well developed, well-nourished, alert and in no acute distress, voice clear and strong    Head   Inspection  no deformities or lesions      Face   Inspection  no facial lesions; House-Brackmann I/VI bilaterally   Palpation  no TMJ crepitus nor  muscle tenderness bilaterally     Eyes/Vision   Visual Fields  extraocular movements are intact, no spontaneous or gaze-induced nystagmus  Conjunctivae  clear   Sclerae  clear   Pupils and Irises  pupils equal, round, and reactive to light.   Nystagmus  not present     Ears, Nose, Mouth and Throat  Ears  External Ears  appearance within normal limits, no lesions present   Otoscopic Examination  Right " ear cerumen impaction cleaned with otoscope.  Tympanic membrane appearance within normal limits bilaterally with perforation noted on the right posterior tympanic membrane about 10% with retraction of the posterior half of the tympanic membrane.    Hearing   Decreased in right ear  Tunning fork testing    Rinne:  West:    Nose  External Nose  appearance normal   Intranasal Exam  mucosa within normal limits, vestibules normal, no intranasal lesions present, septum midline, sinuses non tender to percussion   Modified L[place]h Test:    Oral Cavity  Oral Mucosa  oral mucosa normal without pallor or cyanosis   Lips  lip appearance normal   Teeth  normal dentition for age   Gums  gums pink, non-swollen, no bleeding present   Tongue  tongue appearance normal; normal mobility   Palate  hard palate normal, soft palate appearance normal with symmetric mobility     Throat  Oropharynx  no inflammation or lesions present, tonsils within normal limits   Hypopharynx  appearance within normal limits   Larynx  voice normal     Neck  Inspection/Palpation  normal appearance, no masses or tenderness, trachea midline; thyroid size normal, nontender, no nodules or masses present on palpation     Lymphatic  Neck  no lymphadenopathy present   Supraclavicular Nodes  no lymphadenopathy present   Preauricular Nodes  no lymphadenopathy present     Respiratory  Respiratory Effort  breathing unlabored   Inspection of Chest  normal appearance, no retractions     Musculoskeletal   Cervical back: Normal range of motion and neck supple.      Skin and Subcutaneous Tissue  General Inspection  Regarding face and neck - there are no rashes present, no lesions present, and no areas of discoloration     Neurologic  Cranial Nerves  cranial nerves II-XII are grossly intact bilaterally   Gait and Station  normal gait, able to stand without diffculty    Psychiatric  Judgement and Insight  judgment and insight intact   Mood and Affect  mood normal, affect  appropriate       RESULTS REVIEWED    I have reviewed the following information:   [x]  Previous Internal Note  [x]  Previous External Note:   [x]  Ordered Tests & Results:      Pathology:      TSH   Date Value Ref Range Status   07/19/2023 1.080 0.270 - 4.200 uIU/mL Final     Free T4   Date Value Ref Range Status   07/19/2023 1.14 0.93 - 1.70 ng/dL Final     Calcium   Date Value Ref Range Status   10/31/2023 9.8 8.6 - 10.5 mg/dL Final       No Images in the past 120 days found..    I have discussed the interpretation of the above results with the patient.    Ear Cerumen Removal    Date/Time: 1/17/2024 9:40 AM    Performed by: Ranjit Stokes MD  Authorized by: Ranjit Stokes MD    Anesthesia:  Local Anesthetic: none  Location details: right ear  Comments: Right ear cerumen impaction was cleaned under otoscope.  Right tympanic membrane with posterior perforation noted about 5% with retraction at the posterior half of the tympanic membrane.  Procedure type: instrumentation, curette, alligator forceps     Flexible laryngoscopy    Date/Time: 1/17/2024 9:54 AM    Performed by: Ranjit Stokes MD  Authorized by: Ranjit Stokes MD    Consent:     Consent obtained:  Verbal    Consent given by:  Patient    Risks discussed:  Bleeding and pain    Alternatives discussed:  No treatment and delayed treatment  Procedure details:     Medication:  Afrin and Isaias-Synephrine 1%    Instrument: flexible fiberoptic laryngoscope      Scope location: right nare    Post-procedure details:     Patient tolerance of procedure:  Tolerated well  Comments:      Flexible fiberoptic nasal laryngoscope was inserted through the right nostril and examined the nasal cavity which was unremarkable with mild nasal congestion on both sides.  Nasopharynx shows some residual adenoid tissue in the midportion.  Eustachian tube area shows a little bit of edema and not clearly well-defined but appears to have no other mass present.  Hypopharynx is unremarkable  other than tonsils on either side and base of tongue with some lymphoid hypertrophy.  Otherwise epiglottis, piriform sinuses, arytenoids, true and false vocal cords were all within normal limits.  There was good mobility on phonation.  Subglottis was normal also.            Assessment and Plan   Diagnoses and all orders for this visit:    1. Chronic Eustachian tube dysfunction, right (Primary)  -     Comprehensive Hearing Test; Future  -     CT IAC With & Without Contrast; Future    2. History of tobacco abuse    3. Allergic rhinitis, unspecified seasonality, unspecified trigger    4. Hearing loss of right ear due to cerumen impaction  -     Ear Cerumen Removal    5. Conductive hearing loss of right ear with unrestricted hearing of left ear  -     Comprehensive Hearing Test; Future  -     CT IAC With & Without Contrast; Future    6. Central perforation of tympanic membrane of right ear  -     Comprehensive Hearing Test; Future  -     CT IAC With & Without Contrast; Future    7. Dizziness    8. Vertigo  -     Comprehensive Hearing Test; Future  -     CT IAC With & Without Contrast; Future    Other orders  -     $ Laryngoscopy        Angie Harp  reports that she quit smoking about 2 years ago. Her smoking use included cigarettes. She has never used smokeless tobacco. I have educated her on the risk of diseases from using tobacco products such as Cancer, COPD & Heart Disease.       Plan:  Patient Instructions   1.  Patient has long history of eustachian tube dysfunction on the right ear.  She never followed up after recommendation of PE tube and flexible nasolaryngoscopy 9 years ago.  2.  Despite removal of cerumen impaction, patient continued to have hearing loss on the right ear.  Therefore we will obtain an audiogram today to evaluate the degree of conductive hearing loss and also looking at the tympanic membrane perforation as well as retraction and vertigo.    3.  Audiogram today shows type C tympanogram  on the left and type B on the right.  SRT is 50 on the right and 15 on the left.  Discrimination scores are 100% otherwise.  There is a moderate mixed hearing loss on the right ear while hearing is normal on the left ear.  With this large air-bone gap, I am going to proceed with CT of IACs..  4.  We will proceed with flexible nasolaryngoscopy today also and shows that she still have some residual adenoid tissue present.  Eustachian tube area shows some edema and not clearly defined.  Otherwise nasopharynx is unremarkable  5.  Follow-up after CT scan     47 minutes were spent caring for Angie on this date of service. This time spent by me includes preparing for the visit, reviewing tests, obtaining/reviewing separately obtained history, performing medically appropriate exam/evaluation, counseling/educating the patient/family/caregiver, ordering medications/tests/procedures, referring/communicating with other health care professionals, documenting information in the medical record, independently interpreting results and communicating that with the patient/family/caregiver and/or care coordination.       Follow Up   Return in about 1 month (around 2/17/2024), or CT of IACs and follow-up.  Patient was given instructions and counseling regarding her condition or for health maintenance advice. Please see specific information pulled into the AVS if appropriate.

## 2024-01-17 NOTE — PROGRESS NOTES
AUDIOMETRIC EVALUATION      Name:  Angie Harp  :  1990  Age:  34 y.o.  Date of Evaluation:  2024       History:  Mrs. Harp is seen today for a hearing evaluation due to hearing loss and tinnitus.    Audiologic Information:  Concerns for Hearing: Yes  PETs: Currently no  Other otologic surgical history: No  Aural Pressure/Fullness: Yes  Otalgia: No  Otorrhea: Not currently  Tinnitus: No  Dizziness: Yes  Noise Exposure: No  Family history of hearing loss: No  Head trauma requiring hospital stay: No  Chemotherapy: None  Other significant history: No    EVALUATION:    See hearing evaluation    RESULTS:    Otoscopic Evaluation:        NOTE: Testing completed after ears were examined by Dr. Stokes.    Tympanometry (226 Hz):  Right: Type B, Normal ECV  Left: Type C      IMPRESSIONS:  Pure tone thresholds for the right ear shows a moderate mixed hearing loss.  Pure tone thresholds for the left ear shows hearing within normal limits.  Patient was counseled with regard to the findings.    RECOMMENDATIONS/PLAN:  Follow-up recommendations of Dr. Stokes.  Discussed results and recommendations with patient.         Dante Robison M.S, [facility]  Licensed Audiologist

## 2024-01-20 ENCOUNTER — HOSPITAL ENCOUNTER (OUTPATIENT)
Dept: ULTRASOUND IMAGING | Facility: HOSPITAL | Age: 34
Discharge: HOME | End: 2024-01-20
Admitting: OBSTETRICS & GYNECOLOGY
Payer: COMMERCIAL

## 2024-01-20 DIAGNOSIS — N93.9 ABNORMAL UTERINE BLEEDING: ICD-10-CM

## 2024-01-20 PROCEDURE — 76830 TRANSVAGINAL US NON-OB: CPT

## 2024-02-03 ENCOUNTER — APPOINTMENT (OUTPATIENT)
Dept: GENERAL RADIOLOGY | Facility: HOSPITAL | Age: 34
End: 2024-02-03
Payer: MEDICAID

## 2024-02-03 ENCOUNTER — HOSPITAL ENCOUNTER (EMERGENCY)
Facility: HOSPITAL | Age: 34
Discharge: HOME | End: 2024-02-03
Attending: EMERGENCY MEDICINE
Payer: MEDICAID

## 2024-02-03 VITALS
WEIGHT: 169.31 LBS | HEIGHT: 62 IN | TEMPERATURE: 99.2 F | OXYGEN SATURATION: 96 % | BODY MASS INDEX: 31.16 KG/M2 | RESPIRATION RATE: 18 BRPM | DIASTOLIC BLOOD PRESSURE: 57 MMHG | HEART RATE: 79 BPM | SYSTOLIC BLOOD PRESSURE: 91 MMHG

## 2024-02-03 DIAGNOSIS — J11.1 FLU: Primary | ICD-10-CM

## 2024-02-03 LAB
ALBUMIN SERPL-MCNC: 4.3 G/DL (ref 3.5–5.2)
ALBUMIN/GLOB SERPL: 1.5 G/DL
ALP SERPL-CCNC: 45 U/L (ref 39–117)
ALT SERPL W P-5'-P-CCNC: 13 U/L (ref 1–33)
ANION GAP SERPL CALCULATED.3IONS-SCNC: 10 MMOL/L (ref 5–15)
AST SERPL-CCNC: 16 U/L (ref 1–32)
BASOPHILS # BLD AUTO: 0.02 10*3/MM3 (ref 0–0.2)
BASOPHILS NFR BLD AUTO: 0.4 % (ref 0–1.5)
BILIRUB SERPL-MCNC: 0.2 MG/DL (ref 0–1.2)
BUN SERPL-MCNC: 15 MG/DL (ref 6–20)
BUN/CREAT SERPL: 18.1 (ref 7–25)
CALCIUM SPEC-SCNC: 9.1 MG/DL (ref 8.6–10.5)
CHLORIDE SERPL-SCNC: 103 MMOL/L (ref 98–107)
CO2 SERPL-SCNC: 24 MMOL/L (ref 22–29)
CREAT SERPL-MCNC: 0.83 MG/DL (ref 0.57–1)
DEPRECATED RDW RBC AUTO: 41.5 FL (ref 37–54)
EGFRCR SERPLBLD CKD-EPI 2021: 93.8 ML/MIN/1.73
EOSINOPHIL # BLD AUTO: 0.01 10*3/MM3 (ref 0–0.4)
EOSINOPHIL NFR BLD AUTO: 0.2 % (ref 0.3–6.2)
ERYTHROCYTE [DISTWIDTH] IN BLOOD BY AUTOMATED COUNT: 12.6 % (ref 12.3–15.4)
FLUAV SUBTYP SPEC NAA+PROBE: DETECTED
FLUBV RNA ISLT QL NAA+PROBE: NOT DETECTED
GLOBULIN UR ELPH-MCNC: 2.8 GM/DL
GLUCOSE SERPL-MCNC: 106 MG/DL (ref 65–99)
HCT VFR BLD AUTO: 38.5 % (ref 34–46.6)
HGB BLD-MCNC: 12.9 G/DL (ref 12–15.9)
IMM GRANULOCYTES # BLD AUTO: 0.02 10*3/MM3 (ref 0–0.05)
IMM GRANULOCYTES NFR BLD AUTO: 0.4 % (ref 0–0.5)
LYMPHOCYTES # BLD AUTO: 0.64 10*3/MM3 (ref 0.7–3.1)
LYMPHOCYTES NFR BLD AUTO: 12.8 % (ref 19.6–45.3)
MCH RBC QN AUTO: 30 PG (ref 26.6–33)
MCHC RBC AUTO-ENTMCNC: 33.5 G/DL (ref 31.5–35.7)
MCV RBC AUTO: 89.5 FL (ref 79–97)
MONOCYTES # BLD AUTO: 0.56 10*3/MM3 (ref 0.1–0.9)
MONOCYTES NFR BLD AUTO: 11.2 % (ref 5–12)
NEUTROPHILS NFR BLD AUTO: 3.75 10*3/MM3 (ref 1.7–7)
NEUTROPHILS NFR BLD AUTO: 75 % (ref 42.7–76)
NRBC BLD AUTO-RTO: 0 /100 WBC (ref 0–0.2)
PLATELET # BLD AUTO: 205 10*3/MM3 (ref 140–450)
PMV BLD AUTO: 10.2 FL (ref 6–12)
POTASSIUM SERPL-SCNC: 3.8 MMOL/L (ref 3.5–5.2)
PROT SERPL-MCNC: 7.1 G/DL (ref 6–8.5)
RBC # BLD AUTO: 4.3 10*6/MM3 (ref 3.77–5.28)
RSV RNA NPH QL NAA+NON-PROBE: NOT DETECTED
S PYO AG THROAT QL: NEGATIVE
SARS-COV-2 RNA RESP QL NAA+PROBE: NOT DETECTED
SODIUM SERPL-SCNC: 137 MMOL/L (ref 136–145)
WBC NRBC COR # BLD AUTO: 5 10*3/MM3 (ref 3.4–10.8)

## 2024-02-03 PROCEDURE — 80053 COMPREHEN METABOLIC PANEL: CPT | Performed by: EMERGENCY MEDICINE

## 2024-02-03 PROCEDURE — 71046 X-RAY EXAM CHEST 2 VIEWS: CPT

## 2024-02-03 PROCEDURE — 85025 COMPLETE CBC W/AUTO DIFF WBC: CPT | Performed by: EMERGENCY MEDICINE

## 2024-02-03 PROCEDURE — 87081 CULTURE SCREEN ONLY: CPT | Performed by: EMERGENCY MEDICINE

## 2024-02-03 PROCEDURE — 87637 SARSCOV2&INF A&B&RSV AMP PRB: CPT | Performed by: EMERGENCY MEDICINE

## 2024-02-03 PROCEDURE — 87880 STREP A ASSAY W/OPTIC: CPT | Performed by: EMERGENCY MEDICINE

## 2024-02-03 PROCEDURE — 99283 EMERGENCY DEPT VISIT LOW MDM: CPT

## 2024-02-03 RX ORDER — OSELTAMIVIR PHOSPHATE 75 MG/1
75 CAPSULE ORAL 2 TIMES DAILY
Qty: 10 CAPSULE | Refills: 0 | Status: SHIPPED | OUTPATIENT
Start: 2024-02-03 | End: 2024-02-08

## 2024-02-03 RX ORDER — SODIUM CHLORIDE 0.9 % (FLUSH) 0.9 %
10 SYRINGE (ML) INJECTION AS NEEDED
Status: DISCONTINUED | OUTPATIENT
Start: 2024-02-03 | End: 2024-02-03 | Stop reason: HOSPADM

## 2024-02-03 NOTE — ED NOTES
Patient asked for urine sample. Patient states she doesn't have to urinate at this time. RN notified.

## 2024-02-03 NOTE — ED PROVIDER NOTES
Time: 8:41 AM EST  Date of encounter:  2/3/2024  Independent Historian/Clinical History and Information was obtained by:   Patient  Chief Complaint: Headache, fever, body aches    History is limited by: N/A    History of Present Illness:  Patient is a 34 y.o. year old female who presents to the emergency department for evaluation of headache, nonproductive cough, chills, subjective fever, body aches for 2 days.  Patient states that she has a burning sensation in her lungs.  Denies diarrhea, nausea, vomiting, chest pain, and shortness of breath.  No known sick contacts.  Current daily vapor.    HPI    Patient Care Team  Primary Care Provider: Angelica Lo APRN    Past Medical History:     No Known Allergies  Past Medical History:   Diagnosis Date    Allergic     Anxiety     Arthritis     Asthma     Depression      Past Surgical History:   Procedure Laterality Date     SECTION      TUBAL ABDOMINAL LIGATION      WISDOM TOOTH EXTRACTION       Family History   Problem Relation Age of Onset    Stroke Mother     Heart disease Mother     Breast cancer Neg Hx     Ovarian cancer Neg Hx     Uterine cancer Neg Hx     Colon cancer Neg Hx     Melanoma Neg Hx     Prostate cancer Neg Hx     Deep vein thrombosis Neg Hx        Home Medications:  Prior to Admission medications    Medication Sig Start Date End Date Taking? Authorizing Provider   albuterol sulfate  (90 Base) MCG/ACT inhaler Inhale 2 puffs Every 4 (Four) Hours As Needed for Wheezing or Shortness of Air. 23   Angelica Lo APRN   meclizine (ANTIVERT) 25 MG tablet Take 1 tablet by mouth 3 (Three) Times a Day As Needed for Dizziness.  Patient not taking: Reported on 23   Dulce Quiroz APRN        Social History:   Social History     Tobacco Use    Smoking status: Former     Types: Cigarettes     Quit date:      Years since quittin.0    Smokeless tobacco: Never   Vaping Use    Vaping Use: Every day    Substances:  "Nicotine    Devices: Disposable   Substance Use Topics    Alcohol use: Never    Drug use: Never         Review of Systems:  Review of Systems   Constitutional:  Negative for chills and fever.   HENT:  Negative for congestion, ear pain, facial swelling, rhinorrhea, sinus pain, sore throat and tinnitus.    Eyes:  Negative for pain.   Respiratory:  Positive for cough. Negative for chest tightness and shortness of breath.    Cardiovascular:  Negative for chest pain.   Gastrointestinal:  Negative for abdominal pain, diarrhea, nausea and vomiting.   Genitourinary:  Negative for flank pain and hematuria.   Musculoskeletal:  Positive for myalgias. Negative for joint swelling.   Skin:  Negative for pallor.   Neurological:  Positive for headaches. Negative for seizures.   All other systems reviewed and are negative.         Physical Exam:  BP 91/57   Pulse 79   Temp 99.2 °F (37.3 °C) (Oral)   Resp 18   Ht 157.5 cm (62\")   Wt 76.8 kg (169 lb 5 oz)   LMP 01/01/2024   SpO2 96%   BMI 30.97 kg/m²     Physical Exam  Vitals and nursing note reviewed.   Constitutional:       General: She is not in acute distress.     Appearance: Normal appearance. She is ill-appearing. She is not toxic-appearing or diaphoretic.   HENT:      Head: Normocephalic and atraumatic.      Nose: No congestion or rhinorrhea.      Mouth/Throat:      Mouth: Mucous membranes are moist.   Eyes:      General: No scleral icterus.     Conjunctiva/sclera: Conjunctivae normal.   Cardiovascular:      Rate and Rhythm: Normal rate and regular rhythm.      Heart sounds: Normal heart sounds. No murmur heard.  Pulmonary:      Effort: Pulmonary effort is normal. No respiratory distress.      Breath sounds: Normal breath sounds. No wheezing.   Abdominal:      General: Abdomen is flat.      Palpations: Abdomen is soft.      Tenderness: There is no abdominal tenderness.   Musculoskeletal:         General: Normal range of motion.      Cervical back: Normal range of motion " and neck supple.   Skin:     General: Skin is warm and dry.      Capillary Refill: Capillary refill takes less than 2 seconds.   Neurological:      General: No focal deficit present.      Mental Status: She is alert and oriented to person, place, and time. Mental status is at baseline.   Psychiatric:         Judgment: Judgment normal.         Vital signs were reviewed under triage note.            Procedures:  Procedures      Medical Decision Making:      Comorbidities that affect care:    Allergy, anxiety, asthma, depression and arthritis    External Notes reviewed:    Previous Clinic Note: 1/17/2020 for chief complaint of earwax and long history of ear problem diagnosed with chronic ED states she had tube dysfunction, tobacco use, allergic rhinitis, dizziness and vertigo      The following orders were placed and all results were independently analyzed by me:  Orders Placed This Encounter   Procedures    COVID-19, FLU A/B, RSV PCR 1 HR TAT - Swab, Nasopharynx    Rapid Strep A Screen - Swab, Throat    Beta Strep Culture, Throat - Swab, Throat    XR Chest 2 View    Comprehensive Metabolic Panel    Urinalysis With Microscopic If Indicated (No Culture) - Urine, Clean Catch    CBC Auto Differential    Vital Signs    Access VAD if Present    Insert Peripheral IV (if No VAD Present)    CBC & Differential       Medications Given in the Emergency Department:  Medications   sodium chloride 0.9 % flush 10 mL (has no administration in time range)        ED Course:        Labs:    Lab Results (last 24 hours)       Procedure Component Value Units Date/Time    COVID-19, FLU A/B, RSV PCR 1 HR TAT - Swab, Nasopharynx [[id number]]  (Abnormal) Collected: 02/03/24 0849    Specimen: Swab from Nasopharynx Updated: 02/03/24 0942     COVID19 Not Detected     Influenza A PCR Detected     Influenza B PCR Not Detected     RSV, PCR Not Detected    Narrative:      Fact sheet for providers: https://www.fda.gov/media/296163/download    Fact sheet  for patients: https://www.Websand.gov/media/494454/download    Test performed by PCR.    Rapid Strep A Screen - Swab, Throat [[id number]]  (Normal) Collected: 02/03/24 0849    Specimen: Swab from Throat Updated: 02/03/24 0914     Strep A Ag Negative    Beta Strep Culture, Throat - Swab, Throat [[id number]] Collected: 02/03/24 0849    Specimen: Swab from Throat Updated: 02/03/24 0914    CBC & Differential [[id number]]  (Abnormal) Collected: 02/03/24 0912    Specimen: Blood Updated: 02/03/24 0922    Narrative:      The following orders were created for panel order CBC & Differential.  Procedure                               Abnormality         Status                     ---------                               -----------         ------                     CBC Auto Differential[[id number]]        Abnormal            Final result                 Please view results for these tests on the individual orders.    Comprehensive Metabolic Panel [[id number]]  (Abnormal) Collected: 02/03/24 0912    Specimen: Blood Updated: 02/03/24 0946     Glucose 106 mg/dL      BUN 15 mg/dL      Creatinine 0.83 mg/dL      Sodium 137 mmol/L      Potassium 3.8 mmol/L      Chloride 103 mmol/L      CO2 24.0 mmol/L      Calcium 9.1 mg/dL      Total Protein 7.1 g/dL      Albumin 4.3 g/dL      ALT (SGPT) 13 U/L      AST (SGOT) 16 U/L      Alkaline Phosphatase 45 U/L      Total Bilirubin 0.2 mg/dL      Globulin 2.8 gm/dL      A/G Ratio 1.5 g/dL      BUN/Creatinine Ratio 18.1     Anion Gap 10.0 mmol/L      eGFR 93.8 mL/min/1.73     Narrative:      GFR Normal >60  Chronic Kidney Disease <60  Kidney Failure <15      CBC Auto Differential [[id number]]  (Abnormal) Collected: 02/03/24 0912    Specimen: Blood Updated: 02/03/24 0922     WBC 5.00 10*3/mm3      RBC 4.30 10*6/mm3      Hemoglobin 12.9 g/dL      Hematocrit 38.5 %      MCV 89.5 fL      MCH 30.0 pg      MCHC 33.5 g/dL      RDW 12.6 %      RDW-SD 41.5 fl      MPV 10.2 fL      Platelets 205 10*3/mm3       Neutrophil % 75.0 %      Lymphocyte % 12.8 %      Monocyte % 11.2 %      Eosinophil % 0.2 %      Basophil % 0.4 %      Immature Grans % 0.4 %      Neutrophils, Absolute 3.75 10*3/mm3      Lymphocytes, Absolute 0.64 10*3/mm3      Monocytes, Absolute 0.56 10*3/mm3      Eosinophils, Absolute 0.01 10*3/mm3      Basophils, Absolute 0.02 10*3/mm3      Immature Grans, Absolute 0.02 10*3/mm3      nRBC 0.0 /100 WBC              Imaging:    XR Chest 2 View    Result Date: 2/3/2024  PROCEDURE: XR CHEST 2 VW  COMPARISON: None  INDICATIONS: COUGH, CHEST BURNING WITH DEEP INSPIRATION  FINDINGS:  Heart size and pulmonary vasculature within normal limits.  Lungs clear.  Costophrenic angles sharp.  No pneumothorax       No active cardiopulmonary disease     SUKUMAR DUARTE MD       Electronically Signed and Approved By: SUKUMAR DUARTE MD on 2/03/2024 at 9:42                Differential Diagnosis and Discussion:    Cough: Differential diagnosis includes but is not limited to pneumonia, acute bronchitis, upper respiratory infection, ACE inhibitor use, allergic reaction, epiglottitis, seasonal allergies, chemical irritants, exercise-induced asthma, viral syndrome.  Headache: Differential diagnosis includes but is not limited to migraine, cluster headache, hypertension, tumor, subarachnoid bleeding, pseudotumor cerebri, temporal arteritis, infections, tension headache, and TMJ syndrome.    All labs were reviewed and interpreted by me.  All X-rays impressions were independently interpreted by me.  EKG was interpreted by supervising attending.    MDM     Amount and/or Complexity of Data Reviewed  Clinical lab tests: reviewed  Tests in the radiology section of CPT®: reviewed    Risk of Complications, Morbidity, and/or Mortality  Presenting problems: moderate  Diagnostic procedures: moderate  Management options: moderate        Patient Care Considerations:    ANTIBIOTICS: I considered prescribing antibiotics as an outpatient however no  bacterial focus of infection was found.      Consultants/Shared Management Plan:    None    Social Determinants of Health:    Patient is independent, reliable, and has access to care.       Disposition and Care Coordination:    Discharged: The patient is suitable and stable for discharge with no need for consideration of admission.    [unfilled]  I have explained discharge medications and the need for follow up with the patient/caretakers. This was also printed in the discharge instructions. Patient was discharged with the following medications and follow up:      Medication List        New Prescriptions      oseltamivir 75 MG capsule  Commonly known as: Tamiflu  Take 1 capsule by mouth 2 (Two) Times a Day for 5 days.               Where to Get Your Medications        These medications were sent to [facility]/pharmacy #[id number] - [place], [place] - [address] - [phone]  - [phone]  [address] [place] [address]      Hours: 24-hours Phone: [phone]   oseltamivir 75 MG capsule      Angelica Lo, APRN  [address]  [facility] [address]  [place] [address]  [phone]    Schedule an appointment as soon as possible for a visit   As needed       Final diagnoses:   Flu        ED Disposition       ED Disposition   Discharge    Condition   Stable    Comment   --               This medical record created using voice recognition software.             Hope Gasca, RADHIKA  02/03/24 1129

## 2024-02-05 LAB — BACTERIA SPEC AEROBE CULT: NORMAL

## 2024-02-14 ENCOUNTER — TELEPHONE (OUTPATIENT)
Dept: OBSTETRICS AND GYNECOLOGY | Facility: CLINIC | Age: 34
End: 2024-02-14
Payer: MEDICAID

## 2024-03-13 ENCOUNTER — TELEPHONE (OUTPATIENT)
Dept: OTOLARYNGOLOGY | Facility: CLINIC | Age: 34
End: 2024-03-13

## 2024-03-20 ENCOUNTER — HOSPITAL ENCOUNTER (OUTPATIENT)
Dept: CT IMAGING | Facility: HOSPITAL | Age: 34
Discharge: HOME | End: 2024-03-20
Admitting: OTOLARYNGOLOGY
Payer: COMMERCIAL

## 2024-03-20 DIAGNOSIS — R42 VERTIGO: ICD-10-CM

## 2024-03-20 DIAGNOSIS — H90.11 CONDUCTIVE HEARING LOSS OF RIGHT EAR WITH UNRESTRICTED HEARING OF LEFT EAR: ICD-10-CM

## 2024-03-20 DIAGNOSIS — H69.91 CHRONIC EUSTACHIAN TUBE DYSFUNCTION, RIGHT: ICD-10-CM

## 2024-03-20 DIAGNOSIS — H72.01 CENTRAL PERFORATION OF TYMPANIC MEMBRANE OF RIGHT EAR: ICD-10-CM

## 2024-03-20 PROCEDURE — 25510000001 IOPAMIDOL 61 % SOLUTION: Performed by: OTOLARYNGOLOGY

## 2024-03-20 PROCEDURE — 70481 CT ORBIT/EAR/FOSSA W/DYE: CPT

## 2024-03-20 RX ADMIN — IOPAMIDOL 100 ML: 612 INJECTION, SOLUTION INTRAVENOUS at 11:31

## 2024-03-27 ENCOUNTER — OFFICE VISIT (OUTPATIENT)
Dept: OTOLARYNGOLOGY | Facility: CLINIC | Age: 34
End: 2024-03-27
Payer: COMMERCIAL

## 2024-03-27 VITALS
HEIGHT: 62 IN | HEART RATE: 80 BPM | BODY MASS INDEX: 30.97 KG/M2 | SYSTOLIC BLOOD PRESSURE: 116 MMHG | DIASTOLIC BLOOD PRESSURE: 76 MMHG | TEMPERATURE: 97.1 F

## 2024-03-27 DIAGNOSIS — H61.21 HEARING LOSS DUE TO CERUMEN IMPACTION, RIGHT: ICD-10-CM

## 2024-03-27 DIAGNOSIS — Z87.891 HISTORY OF TOBACCO ABUSE: ICD-10-CM

## 2024-03-27 DIAGNOSIS — H69.91 CHRONIC EUSTACHIAN TUBE DYSFUNCTION, RIGHT: Primary | ICD-10-CM

## 2024-03-27 DIAGNOSIS — H90.11 CONDUCTIVE HEARING LOSS OF RIGHT EAR WITH UNRESTRICTED HEARING OF LEFT EAR: ICD-10-CM

## 2024-03-27 DIAGNOSIS — H72.01 CENTRAL PERFORATION OF TYMPANIC MEMBRANE OF RIGHT EAR: ICD-10-CM

## 2024-03-27 NOTE — PATIENT INSTRUCTIONS
1.  I have reviewed the CT scan with patient and it shows that basically her eustachian tube dysfunction have dated quite far back in the development such that hardly any of the mastoid on the either side have developed hardly at all.  She does have a small air pocket in the attic and the ossicles seem to be unremarkable.  There is no evidence at this point of otosclerosis or cholesteatoma either.  I think most likely her right ear conductive hearing loss of about 20 dB is mostly due to perforation of the tympanic membrane the posterior superior quadrant of about 25 to 30%.  Therefore I would recommend obtaining an audiogram on an annual basis.  2.  I have also discussed the option of trying to go in and repair the perforation but I am not sure of the eustachian tube function at this time whether dysfunction has resolved or not and also the tympanic membrane around the perforation does not appear to have much of viability in terms of vasculature.  This means that and repair the perforation might have to be a lot larger in order to obtain enough vascularity to try to place a graft.  3.  Given the degree of hearing loss on the right ear, I do not think patient needs hearing aids at this time and also I would recommend just follow-up with audiogram on an annual basis.  If the conductive loss continues to increase, then I might consider looking into whether perforation has gotten bigger or possibly may be there is a otosclerotic process going on.

## 2024-03-27 NOTE — PROGRESS NOTES
Patient Name: Angie Harp   Visit Date: 03/27/2024   Patient ID: [id number]  Provider: Ranjit Stokes MD    Sex: female  Location: [facility]   YOB: 1990  Location Address: [address] [place] [address], [address] [place] [address],?[id number]    Primary Care Provider Angelica Lo, RADHIKA  Location Phone: [phone]    Referring Provider: No ref. provider found        Chief Complaint  CT RESULTS    History of Present Illness  Angie Harp is a 34 y.o. female who presents to [facility] for CT RESULTS.   Patient has a long history of ear problems where she was seen at [facility] in 10/17/2014 with complaints of right-sided hearing loss and fullness.  She was noted to have right tympanic membrane significantly retracted and audiogram obtained at that time showed that she has a large conductive gap of about 40 dB with type a tympanogram and small volume.  Her SRT was 45 on the right and 10 on the left while discrimination scores were 100%.  Left hearing was within normal limits but right moderate conductive loss was noted.  She was recommended myringotomy with tube and flexible nasolaryngoscopy.  However patient was lost to follow-up.  On 11/14/2023 patient was seen at the office by Ms. Quiroz who noted that patient complained of dizziness and lightheadedness.  Patient was seen in the emergency room on 10/31/2023 for fainting.  Patient also had nausea associated with that and she was treated with Zofran at home from the ER.  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  It appears that patient most likely had serous chronic effusion that had tympanic membrane rupture resulting in a chronic perforation most likely dating about 9 ears.  Patient complaining of hearing loss in the right ear.   Patient has long history of eustachian tube dysfunction on the right ear.  She never followed up after recommendation of PE  "tube and flexible nasolaryngoscopy 9 years ago.  Despite removal of cerumen impaction, patient continued to have hearing loss on the right ear.  Therefore an audiogram was obtained on last visit to evaluate the degree of conductive hearing loss and also looking at the tympanic membrane perforation as well as retraction and vertigo.    Audiogram showed type C tympanogram on the left and type B on the right.  SRT is 50 on the right and 15 on the left.  Discrimination scores are 100% otherwise.  There is a moderate mixed hearing loss on the right ear while hearing is normal on the left ear.  With this large air-bone gap, patient had CT of IACs performed with follow-up today.  Flexible nasolaryngoscopy on last visit also showed that she still have some residual adenoid tissue present.  Eustachian tube area shows some edema and not clearly defined. Otherwise nasopharynx is unremarkable    Past Medical History:   Diagnosis Date    Allergic     Anxiety     Arthritis     Asthma     Depression        Past Surgical History:   Procedure Laterality Date     SECTION      TUBAL ABDOMINAL LIGATION      WISDOM TOOTH EXTRACTION         No current outpatient medications on file.     No Known Allergies    Social History     Tobacco Use    Smoking status: Former     Current packs/day: 0.00     Types: Cigarettes     Quit date:      Years since quittin.2    Smokeless tobacco: Never   Vaping Use    Vaping status: Every Day    Substances: Nicotine    Devices: Disposable   Substance Use Topics    Alcohol use: Never    Drug use: Never        Objective     Vital Signs:   Vitals:    24 0946   BP: 116/76   BP Location: Left arm   Patient Position: Sitting   Cuff Size: Adult   Pulse: 80   Temp: 97.1 °F (36.2 °C)   TempSrc: Temporal   Height: 157.5 cm (62\")       Tobacco Use: Medium Risk (3/27/2024)    Patient History     Smoking Tobacco Use: Former     Smokeless Tobacco Use: Never     Passive Exposure: Not on file "         Physical Exam    Constitutional   Appearance  well developed, well-nourished, alert and in no acute distress, voice clear and strong    Head   Inspection  no deformities or lesions      Face   Inspection  no facial lesions; House-Brackmann I/VI bilaterally   Palpation  no TMJ crepitus nor  muscle tenderness bilaterally     Eyes/Vision   Visual Fields  extraocular movements are intact, no spontaneous or gaze-induced nystagmus  Conjunctivae  clear   Sclerae  clear   Pupils and Irises  pupils equal, round, and reactive to light.   Nystagmus  not present     Ears, Nose, Mouth and Throat  Ears  External Ears  appearance within normal limits, no lesions present   Otoscopic Examination  Left TM and EACs clear  Right ear canal with cerumen impaction cleaned otherwise tympanic membrane has a 25-30% perforation in the posterior superior quadrant.  Surrounding tympanic membrane does not appear to have much vascularity at all.  Hearing  intact to conversational voice both ears   Tunning fork testing    [place]inne:  West:    Nose  External Nose  appearance normal   Intranasal Exam  mucosa within normal limits, vestibules normal, no intranasal lesions present, septum midline, sinuses non tender to percussion   Modified Nolan Test:    Oral Cavity  Oral Mucosa  oral mucosa normal without pallor or cyanosis   Lips  lip appearance normal   Teeth  normal dentition for age   Gums  gums pink, non-swollen, no bleeding present   Tongue  tongue appearance normal; normal mobility   Palate  hard palate normal, soft palate appearance normal with symmetric mobility     Throat  Oropharynx  no inflammation or lesions present, tonsils within normal limits   Hypopharynx  appearance within normal limits   Larynx  voice normal     Neck  Inspection/Palpation  normal appearance, no masses or tenderness, trachea midline; thyroid size normal, nontender, no nodules or masses present on palpation     Lymphatic  Neck  no lymphadenopathy  present   Supraclavicular Nodes  no lymphadenopathy present   Preauricular Nodes  no lymphadenopathy present     Respiratory  Respiratory Effort  breathing unlabored   Inspection of Chest  normal appearance, no retractions     Musculoskeletal   Cervical back: Normal range of motion and neck supple.      Skin and Subcutaneous Tissue  General Inspection  Regarding face and neck - there are no rashes present, no lesions present, and no areas of discoloration     Neurologic  Cranial Nerves  cranial nerves II-XII are grossly intact bilaterally   Gait and Station  normal gait, able to stand without diffculty    Psychiatric  Judgement and Insight  judgment and insight intact   Mood and Affect  mood normal, affect appropriate       RESULTS REVIEWED    I have reviewed the following information:   [x]  Previous Internal Note  []  Previous External Note:   [x]  Ordered Tests & Results:      Pathology:      TSH   Date Value Ref Range Status   07/19/2023 1.080 0.270 - 4.200 uIU/mL Final     Free T4   Date Value Ref Range Status   07/19/2023 1.14 0.93 - 1.70 ng/dL Final     Calcium   Date Value Ref Range Status   02/03/2024 9.1 8.6 - 10.5 mg/dL Final       CT IAC With Contrast    Result Date: 3/21/2024  Impression: 1.  There is some thickening and retraction of the right tympanic membrane. 2.  Mucous retention cyst or polyp right maxillary sinus.    Electronically Signed By-Kg Paris MD On:3/21/2024 1:20 PM      XR Chest 2 View    Result Date: 2/3/2024   No active cardiopulmonary disease     SUKUMAR DUARTE MD       Electronically Signed and Approved By: SUKUMAR DUARTE MD on 2/03/2024 at 9:42             US Non-ob Transvaginal    Result Date: 1/20/2024   Unremarkable pelvic ultrasound     SUKUMAR DUARTE MD       Electronically Signed and Approved By: SUKUMAR DUARTE MD on 1/20/2024 at 14:37               I have discussed the interpretation of the above results with the patient.    Ear Cerumen Removal    Date/Time: 3/27/2024  10:43 AM    Performed by: Ranjit Stokes MD  Authorized by: Ranjit Stokes MD  Location details: right ear  Comments: Patient under the microscope was cleaned of cerumen impaction in the right ear.  Right tympanic membrane has a 25 to 30% perforation that is without drainage.  There is no vascularity with tympanic membrane that surrounding the perforation.  Otherwise left ear was unremarkable.  Procedure type: instrumentation, alligator forceps, suction   Sedation:  Patient sedated: no                  Assessment and Plan   Diagnoses and all orders for this visit:    1. Chronic Eustachian tube dysfunction, right (Primary)  -     Comprehensive Hearing Test; Future    2. Conductive hearing loss of right ear with unrestricted hearing of left ear  -     Comprehensive Hearing Test; Future    3. 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 History of tobacco abuse        Angie Harp  reports that she quit smoking about 2 years ago. Her smoking use included cigarettes. She has never used smokeless tobacco.       Plan:  Patient Instructions   1.  I have reviewed the CT scan with patient and it shows that basically her eustachian tube dysfunction have dated quite far back in the development such that hardly any of the mastoid on the either side have developed hardly at all.  She does have a small air pocket in the attic and the ossicles seem to be unremarkable.  There is no evidence at this point of otosclerosis or cholesteatoma either.  I think most likely her right ear conductive hearing loss of about 20 dB is mostly due to perforation of the tympanic membrane the posterior superior quadrant of about 25 to 30%.  Therefore I would recommend obtaining an audiogram on an annual basis.  2.  I have also discussed the option of trying to go in and repair the perforation but I am not sure of the eustachian tube function at this time whether dysfunction has resolved  or not and also the tympanic membrane around the perforation does not appear to have much of viability in terms of vasculature.  This means that and repair the perforation might have to be a lot larger in order to obtain enough vascularity to try to place a graft.  3.  Given the degree of hearing loss on the right ear, I do not think patient needs hearing aids at this time and also I would recommend just follow-up with audiogram on an annual basis.  If the conductive loss continues to increase, then I might consider looking into whether perforation has gotten bigger or possibly may be there is a otosclerotic process going on.     31 minutes were spent caring for Angie on this date of service. This time spent by me includes preparing for the visit, reviewing tests, obtaining/reviewing separately obtained history, performing medically appropriate exam/evaluation, counseling/educating the patient/family/caregiver, ordering medications/tests/procedures, referring/communicating with other health care professionals, documenting information in the medical record, independently interpreting results and communicating that with the patient/family/caregiver and/or care coordination.       Follow Up   Return in about 1 year (around 3/27/2025), or Follow-up 1 year with audiogram.  Patient was given instructions and counseling regarding her condition or for health maintenance advice. Please see specific information pulled into the AVS if appropriate.

## 2025-04-21 ENCOUNTER — TELEPHONE (OUTPATIENT)
Dept: FAMILY MEDICINE CLINIC | Facility: CLINIC | Age: 35
End: 2025-04-21

## 2025-04-21 NOTE — TELEPHONE ENCOUNTER
Caller: Angie Harp    Relationship to patient: Self    Best call back number: [phone]     Patient is needing: PLEASE CONTACT PATIENT WITH CO=PAY AMOUNT.

## 2025-04-23 NOTE — TELEPHONE ENCOUNTER
HUB TO RELAY      PATIENT HAS NO CO PAY FOR APPT DUE TO IT BEING A PHYSICAL, 3rd attempt: LVM

## 2025-04-29 ENCOUNTER — OFFICE VISIT (OUTPATIENT)
Dept: FAMILY MEDICINE CLINIC | Facility: CLINIC | Age: 35
End: 2025-04-29
Payer: COMMERCIAL

## 2025-04-29 VITALS
HEIGHT: 62 IN | BODY MASS INDEX: 32.2 KG/M2 | HEART RATE: 84 BPM | DIASTOLIC BLOOD PRESSURE: 68 MMHG | OXYGEN SATURATION: 100 % | WEIGHT: 175 LBS | SYSTOLIC BLOOD PRESSURE: 107 MMHG

## 2025-04-29 DIAGNOSIS — Z00.00 ANNUAL PHYSICAL EXAM: Primary | ICD-10-CM

## 2025-04-29 DIAGNOSIS — Z13.220 LIPID SCREENING: ICD-10-CM

## 2025-04-29 DIAGNOSIS — Z11.59 NEED FOR HEPATITIS C SCREENING TEST: ICD-10-CM

## 2025-04-29 DIAGNOSIS — Z87.891 HISTORY OF TOBACCO ABUSE: ICD-10-CM

## 2025-04-29 DIAGNOSIS — Z13.29 THYROID DISORDER SCREEN: ICD-10-CM

## 2025-04-29 DIAGNOSIS — E66.811 CLASS 1 OBESITY WITHOUT SERIOUS COMORBIDITY WITH BODY MASS INDEX (BMI) OF 32.0 TO 32.9 IN ADULT, UNSPECIFIED OBESITY TYPE: ICD-10-CM

## 2025-04-29 PROCEDURE — 99395 PREV VISIT EST AGE 18-39: CPT | Performed by: NURSE PRACTITIONER

## 2025-04-29 NOTE — ASSESSMENT & PLAN NOTE
Patient's (Body mass index is 32.01 kg/m².) indicates that they are obese (BMI >30) with health conditions that include none . Weight is unchanged. BMI  is above average; BMI management plan is completed.  Recommend portion control and increasing exercise.

## 2025-04-30 ENCOUNTER — LAB (OUTPATIENT)
Dept: LAB | Facility: HOSPITAL | Age: 35
End: 2025-04-30
Payer: COMMERCIAL

## 2025-04-30 DIAGNOSIS — Z13.220 LIPID SCREENING: ICD-10-CM

## 2025-04-30 DIAGNOSIS — Z11.59 NEED FOR HEPATITIS C SCREENING TEST: ICD-10-CM

## 2025-04-30 DIAGNOSIS — Z13.29 THYROID DISORDER SCREEN: ICD-10-CM

## 2025-04-30 DIAGNOSIS — Z00.00 ANNUAL PHYSICAL EXAM: ICD-10-CM

## 2025-04-30 LAB
ALBUMIN SERPL-MCNC: 4 G/DL (ref 3.5–5.2)
ALBUMIN/GLOB SERPL: 1.5 G/DL
ALP SERPL-CCNC: 47 U/L (ref 39–117)
ALT SERPL W P-5'-P-CCNC: 15 U/L (ref 1–33)
ANION GAP SERPL CALCULATED.3IONS-SCNC: 13 MMOL/L (ref 5–15)
AST SERPL-CCNC: 13 U/L (ref 1–32)
BASOPHILS # BLD AUTO: 0.07 10*3/MM3 (ref 0–0.2)
BASOPHILS NFR BLD AUTO: 0.9 % (ref 0–1.5)
BILIRUB SERPL-MCNC: 0.4 MG/DL (ref 0–1.2)
BUN SERPL-MCNC: 14 MG/DL (ref 6–20)
BUN/CREAT SERPL: 17.9 (ref 7–25)
CALCIUM SPEC-SCNC: 8.9 MG/DL (ref 8.6–10.5)
CHLORIDE SERPL-SCNC: 107 MMOL/L (ref 98–107)
CHOLEST SERPL-MCNC: 124 MG/DL (ref 0–200)
CO2 SERPL-SCNC: 23 MMOL/L (ref 22–29)
CREAT SERPL-MCNC: 0.78 MG/DL (ref 0.57–1)
DEPRECATED RDW RBC AUTO: 38.8 FL (ref 37–54)
EGFRCR SERPLBLD CKD-EPI 2021: 100.5 ML/MIN/1.73
EOSINOPHIL # BLD AUTO: 0.12 10*3/MM3 (ref 0–0.4)
EOSINOPHIL NFR BLD AUTO: 1.6 % (ref 0.3–6.2)
ERYTHROCYTE [DISTWIDTH] IN BLOOD BY AUTOMATED COUNT: 11.8 % (ref 12.3–15.4)
GLOBULIN UR ELPH-MCNC: 2.7 GM/DL
GLUCOSE SERPL-MCNC: 80 MG/DL (ref 65–99)
HCT VFR BLD AUTO: 37.6 % (ref 34–46.6)
HCV AB SER QL: NORMAL
HDLC SERPL-MCNC: 47 MG/DL (ref 40–60)
HGB BLD-MCNC: 12.8 G/DL (ref 12–15.9)
IMM GRANULOCYTES # BLD AUTO: 0.02 10*3/MM3 (ref 0–0.05)
IMM GRANULOCYTES NFR BLD AUTO: 0.3 % (ref 0–0.5)
LDLC SERPL CALC-MCNC: 65 MG/DL (ref 0–100)
LDLC/HDLC SERPL: 1.42 {RATIO}
LYMPHOCYTES # BLD AUTO: 2.47 10*3/MM3 (ref 0.7–3.1)
LYMPHOCYTES NFR BLD AUTO: 33.4 % (ref 19.6–45.3)
MCH RBC QN AUTO: 30.9 PG (ref 26.6–33)
MCHC RBC AUTO-ENTMCNC: 34 G/DL (ref 31.5–35.7)
MCV RBC AUTO: 90.8 FL (ref 79–97)
MONOCYTES # BLD AUTO: 0.6 10*3/MM3 (ref 0.1–0.9)
MONOCYTES NFR BLD AUTO: 8.1 % (ref 5–12)
NEUTROPHILS NFR BLD AUTO: 4.12 10*3/MM3 (ref 1.7–7)
NEUTROPHILS NFR BLD AUTO: 55.7 % (ref 42.7–76)
NRBC BLD AUTO-RTO: 0 /100 WBC (ref 0–0.2)
PLATELET # BLD AUTO: 247 10*3/MM3 (ref 140–450)
PMV BLD AUTO: 10.5 FL (ref 6–12)
POTASSIUM SERPL-SCNC: 3.8 MMOL/L (ref 3.5–5.2)
PROT SERPL-MCNC: 6.7 G/DL (ref 6–8.5)
RBC # BLD AUTO: 4.14 10*6/MM3 (ref 3.77–5.28)
SODIUM SERPL-SCNC: 143 MMOL/L (ref 136–145)
TRIGL SERPL-MCNC: 51 MG/DL (ref 0–150)
TSH SERPL DL<=0.05 MIU/L-ACNC: 1.66 UIU/ML (ref 0.27–4.2)
VLDLC SERPL-MCNC: 12 MG/DL (ref 5–40)
WBC NRBC COR # BLD AUTO: 7.4 10*3/MM3 (ref 3.4–10.8)

## 2025-04-30 PROCEDURE — 80061 LIPID PANEL: CPT

## 2025-04-30 PROCEDURE — 86803 HEPATITIS C AB TEST: CPT

## 2025-04-30 PROCEDURE — 36415 COLL VENOUS BLD VENIPUNCTURE: CPT

## 2025-04-30 PROCEDURE — 80050 GENERAL HEALTH PANEL: CPT
